# Patient Record
Sex: FEMALE | Race: WHITE | NOT HISPANIC OR LATINO | URBAN - METROPOLITAN AREA
[De-identification: names, ages, dates, MRNs, and addresses within clinical notes are randomized per-mention and may not be internally consistent; named-entity substitution may affect disease eponyms.]

---

## 2021-07-01 ENCOUNTER — OFFICE VISIT (OUTPATIENT)
Dept: URBAN - METROPOLITAN AREA CLINIC 70 | Facility: CLINIC | Age: 66
End: 2021-07-01
Payer: MEDICARE

## 2021-07-01 ENCOUNTER — LAB OUTSIDE AN ENCOUNTER (OUTPATIENT)
Dept: URBAN - METROPOLITAN AREA CLINIC 70 | Facility: CLINIC | Age: 66
End: 2021-07-01

## 2021-07-01 ENCOUNTER — WEB ENCOUNTER (OUTPATIENT)
Dept: URBAN - METROPOLITAN AREA CLINIC 70 | Facility: CLINIC | Age: 66
End: 2021-07-01

## 2021-07-01 VITALS
HEIGHT: 67 IN | HEART RATE: 99 BPM | SYSTOLIC BLOOD PRESSURE: 169 MMHG | TEMPERATURE: 96.1 F | DIASTOLIC BLOOD PRESSURE: 76 MMHG | BODY MASS INDEX: 31.34 KG/M2 | WEIGHT: 199.7 LBS

## 2021-07-01 DIAGNOSIS — K74.5 BILIARY CIRRHOSIS: ICD-10-CM

## 2021-07-01 DIAGNOSIS — Z12.11 COLON CANCER SCREENING: ICD-10-CM

## 2021-07-01 PROCEDURE — 99203 OFFICE O/P NEW LOW 30 MIN: CPT | Performed by: INTERNAL MEDICINE

## 2021-07-01 RX ORDER — OXCARBAZEPINE 150 MG/1
1 TABLET TABLET, FILM COATED ORAL TWICE A DAY
Status: ACTIVE | COMMUNITY

## 2021-07-01 RX ORDER — PREDNISONE 5 MG/1
TABLET ORAL
Qty: 30 | Status: ACTIVE | COMMUNITY

## 2021-07-01 RX ORDER — OXCARBAZEPINE 150 MG/1
TABLET, FILM COATED ORAL
Qty: 30 | Status: DISCONTINUED | COMMUNITY

## 2021-07-01 RX ORDER — ALPRAZOLAM 0.5 MG/1
TABLET ORAL
Qty: 90 | Status: ACTIVE | COMMUNITY

## 2021-07-01 RX ORDER — LISINOPRIL 40 MG/1
1 TABLET TABLET ORAL ONCE A DAY
Status: ACTIVE | COMMUNITY

## 2021-07-01 RX ORDER — DULOXETINE 60 MG/1
TAKE ONE CAPSULE ONCE A DAY CAPSULE, DELAYED RELEASE ORAL
Qty: 30 | Refills: 6 | Status: ACTIVE | COMMUNITY

## 2021-07-01 RX ORDER — FUROSEMIDE 20 MG/1
TAKE 1 TABLET ONCE A DAY AS NEEDED TABLET ORAL
Qty: 30 | Refills: 4 | Status: DISCONTINUED | COMMUNITY

## 2021-07-01 RX ORDER — IPRATROPIUM BROMIDE AND ALBUTEROL SULFATE .5; 3 MG/3ML; MG/3ML
USE 1 VIAL IN NEBULIZER FOUR TIMES DAILY SOLUTION RESPIRATORY (INHALATION)
Qty: 90 | Refills: 0 | Status: DISCONTINUED | COMMUNITY

## 2021-07-01 RX ORDER — TIZANIDINE 4 MG/1
TAKE 1 TABLET TWICE A DAY TABLET ORAL
Qty: 30 | Refills: 0 | Status: ACTIVE | COMMUNITY

## 2021-07-01 RX ORDER — NAPROXEN 500 MG/1
TABLET ORAL
Qty: 60 | Status: ACTIVE | COMMUNITY

## 2021-07-01 RX ORDER — MINOCYCLINE HYDROCHLORIDE 100 MG/1
1 CAPSULE CAPSULE ORAL
Status: ACTIVE | COMMUNITY

## 2021-07-01 RX ORDER — ROSUVASTATIN CALCIUM 10 MG/1
1 TABLET TABLET, FILM COATED ORAL ONCE A DAY
Status: ACTIVE | COMMUNITY

## 2021-07-01 RX ORDER — IBANDRONATE SODIUM 150 MG/1
TABLET, FILM COATED ORAL
Qty: 1 | Status: DISCONTINUED | COMMUNITY

## 2021-07-01 RX ORDER — ROSUVASTATIN 10 MG/1
TAKE ONE TABLET ONCE A DAY TABLET, FILM COATED ORAL
Qty: 30 | Refills: 6 | Status: DISCONTINUED | COMMUNITY

## 2021-07-01 RX ORDER — LEVOTHYROXINE SODIUM 125 UG/1
TABLET ORAL
Qty: 30 | Status: ACTIVE | COMMUNITY

## 2021-07-01 RX ORDER — LISINOPRIL 40 MG/1
TABLET ORAL
Qty: 90 | Status: DISCONTINUED | COMMUNITY

## 2021-07-01 NOTE — HPI-TODAY'S VISIT:
Pt referred for cirrhosis. She recently had a CT scan ordered by her PCP to evaluate generalized abdominal pain. CT scan showed moderate amount of stool and a nondular liver consistent with cirrhosis. No prior hx of liver disease. She denies alcohol use. She denies being constipated. She reports a normal formed stool every day. She had a colonoscopy >10 years ago. No prior EGD.

## 2021-07-08 ENCOUNTER — LAB OUTSIDE AN ENCOUNTER (OUTPATIENT)
Dept: URBAN - METROPOLITAN AREA CLINIC 70 | Facility: CLINIC | Age: 66
End: 2021-07-08

## 2021-07-08 ENCOUNTER — TELEPHONE ENCOUNTER (OUTPATIENT)
Dept: URBAN - METROPOLITAN AREA CLINIC 70 | Facility: CLINIC | Age: 66
End: 2021-07-08

## 2021-07-08 LAB
A/G RATIO: 1.1
ACTIN (SMOOTH MUSCLE) ANTIBODY: 36
AFP, SERUM, TUMOR MARKER: 1.4
ALBUMIN: 3.2
ALKALINE PHOSPHATASE: 313
ALPHA-1-ANTITRYPSIN, SERUM: 206
ALT (SGPT): 18
ANTINUCLEAR ANTIBODIES, IFA: NEGATIVE
AST (SGOT): 24
BILIRUBIN, TOTAL: <0.2
BUN/CREATININE RATIO: 24
BUN: 18
CALCIUM: 8.5
CARBON DIOXIDE, TOTAL: 22
CERULOPLASMIN: 25.2
CHLORIDE: 104
CREATININE: 0.75
EGFR IF AFRICN AM: 96
EGFR IF NONAFRICN AM: 83
FERRITIN, SERUM: 27
GLOBULIN, TOTAL: 2.8
GLUCOSE: 75
HBSAG SCREEN: NEGATIVE
HEMATOCRIT: 37.8
HEMOGLOBIN: 11.2
HEP A AB, TOTAL: NEGATIVE
HEP C VIRUS AB: <0.1
INR: 1
IRON BIND.CAP.(TIBC): 239
IRON SATURATION: 14
IRON: 33
Lab: (no result)
MCH: 28.3
MCHC: 29.6
MCV: 96
MITOCHONDRIAL (M2) ANTIBODY: <20
NRBC: (no result)
PLATELETS: 332
POTASSIUM: 4.5
PROTEIN, TOTAL: 6
PROTHROMBIN TIME: 10.5
RBC: 3.96
RDW: 14.1
SODIUM: 139
UIBC: 206
WBC: 10.3

## 2021-08-04 ENCOUNTER — OFFICE VISIT (OUTPATIENT)
Dept: URBAN - METROPOLITAN AREA CLINIC 70 | Facility: CLINIC | Age: 66
End: 2021-08-04
Payer: MEDICARE

## 2021-08-04 DIAGNOSIS — D50.9 IRON DEFICIENCY ANEMIA, UNSPECIFIED IRON DEFICIENCY ANEMIA TYPE: ICD-10-CM

## 2021-08-04 DIAGNOSIS — K74.69 CIRRHOSIS, CRYPTOGENIC: ICD-10-CM

## 2021-08-04 DIAGNOSIS — D50.8 ACQUIRED IRON DEFICIENCY ANEMIA DUE TO DECREASED ABSORPTION: ICD-10-CM

## 2021-08-04 DIAGNOSIS — K74.60 CIRRHOSIS OF LIVER WITHOUT ASCITES, UNSPECIFIED HEPATIC CIRRHOSIS TYPE: ICD-10-CM

## 2021-08-04 PROCEDURE — 99214 OFFICE O/P EST MOD 30 MIN: CPT | Performed by: REGISTERED NURSE

## 2021-08-04 RX ORDER — MINOCYCLINE HYDROCHLORIDE 100 MG/1
1 CAPSULE CAPSULE ORAL
Status: ACTIVE | COMMUNITY

## 2021-08-04 RX ORDER — ALPRAZOLAM 0.5 MG/1
TABLET ORAL
Qty: 90 | Status: ACTIVE | COMMUNITY

## 2021-08-04 RX ORDER — TIZANIDINE 4 MG/1
TAKE 1 TABLET TWICE A DAY TABLET ORAL
Qty: 30 | Refills: 0 | Status: ACTIVE | COMMUNITY

## 2021-08-04 RX ORDER — LISINOPRIL 40 MG/1
1 TABLET TABLET ORAL ONCE A DAY
Status: ACTIVE | COMMUNITY

## 2021-08-04 RX ORDER — LEVOTHYROXINE SODIUM 125 UG/1
TABLET ORAL
Qty: 30 | Status: ACTIVE | COMMUNITY

## 2021-08-04 RX ORDER — NAPROXEN 500 MG/1
TABLET ORAL
Qty: 60 | Status: ACTIVE | COMMUNITY

## 2021-08-04 RX ORDER — PREDNISONE 5 MG/1
TABLET ORAL
Qty: 30 | Status: ACTIVE | COMMUNITY

## 2021-08-04 RX ORDER — OXCARBAZEPINE 150 MG/1
1 TABLET TABLET, FILM COATED ORAL TWICE A DAY
Status: ACTIVE | COMMUNITY

## 2021-08-04 RX ORDER — ROSUVASTATIN CALCIUM 10 MG/1
1 TABLET TABLET, FILM COATED ORAL ONCE A DAY
Status: ACTIVE | COMMUNITY

## 2021-08-04 RX ORDER — DULOXETINE 60 MG/1
TAKE ONE CAPSULE ONCE A DAY CAPSULE, DELAYED RELEASE ORAL
Qty: 30 | Refills: 6 | Status: ACTIVE | COMMUNITY

## 2021-08-04 NOTE — HPI-TODAY'S VISIT:
Note from OV 7/1/21: Pt referred for cirrhosis. She recently had a CT scan ordered by her PCP to evaluate generalized abdominal pain. CT scan showed moderate amount of stool and a nondular liver consistent with cirrhosis. No prior hx of liver disease. She denies alcohol use. She denies being constipated. She reports a normal formed stool every day. She had a colonoscopy >10 years ago. No prior EGD. ----------------------------- TODAY: Labs show low iron and positive smooth muscle antibody. Alkaline phosphatase is elevated. She is already scheduled for EGD/Colonoscopy.

## 2021-08-10 ENCOUNTER — LAB OUTSIDE AN ENCOUNTER (OUTPATIENT)
Dept: URBAN - METROPOLITAN AREA CLINIC 70 | Facility: CLINIC | Age: 66
End: 2021-08-10

## 2021-08-10 ENCOUNTER — TELEPHONE ENCOUNTER (OUTPATIENT)
Dept: URBAN - METROPOLITAN AREA CLINIC 70 | Facility: CLINIC | Age: 66
End: 2021-08-10

## 2021-12-03 ENCOUNTER — OFFICE VISIT (OUTPATIENT)
Dept: URBAN - METROPOLITAN AREA MEDICAL CENTER 42 | Facility: MEDICAL CENTER | Age: 66
End: 2021-12-03
Payer: MEDICARE

## 2021-12-03 DIAGNOSIS — K63.5 BENIGN COLON POLYP: ICD-10-CM

## 2021-12-03 DIAGNOSIS — K29.60 ADENOPAPILLOMATOSIS GASTRICA: ICD-10-CM

## 2021-12-03 DIAGNOSIS — D50.9 ANEMIA: ICD-10-CM

## 2021-12-03 PROCEDURE — 43239 EGD BIOPSY SINGLE/MULTIPLE: CPT | Performed by: INTERNAL MEDICINE

## 2021-12-03 PROCEDURE — 45380 COLONOSCOPY AND BIOPSY: CPT | Performed by: INTERNAL MEDICINE

## 2021-12-03 RX ORDER — ALPRAZOLAM 0.5 MG/1
TABLET ORAL
Qty: 90 | Status: ACTIVE | COMMUNITY

## 2021-12-03 RX ORDER — TIZANIDINE 4 MG/1
TAKE 1 TABLET TWICE A DAY TABLET ORAL
Qty: 30 | Refills: 0 | Status: ACTIVE | COMMUNITY

## 2021-12-03 RX ORDER — PREDNISONE 5 MG/1
TABLET ORAL
Qty: 30 | Status: ACTIVE | COMMUNITY

## 2021-12-03 RX ORDER — NAPROXEN 500 MG/1
TABLET ORAL
Qty: 60 | Status: ACTIVE | COMMUNITY

## 2021-12-03 RX ORDER — OXCARBAZEPINE 150 MG/1
1 TABLET TABLET, FILM COATED ORAL TWICE A DAY
Status: ACTIVE | COMMUNITY

## 2021-12-03 RX ORDER — ROSUVASTATIN CALCIUM 10 MG/1
1 TABLET TABLET, FILM COATED ORAL ONCE A DAY
Status: ACTIVE | COMMUNITY

## 2021-12-03 RX ORDER — DULOXETINE 60 MG/1
TAKE ONE CAPSULE ONCE A DAY CAPSULE, DELAYED RELEASE ORAL
Qty: 30 | Refills: 6 | Status: ACTIVE | COMMUNITY

## 2021-12-03 RX ORDER — LEVOTHYROXINE SODIUM 125 UG/1
TABLET ORAL
Qty: 30 | Status: ACTIVE | COMMUNITY

## 2021-12-03 RX ORDER — MINOCYCLINE HYDROCHLORIDE 100 MG/1
1 CAPSULE CAPSULE ORAL
Status: ACTIVE | COMMUNITY

## 2021-12-03 RX ORDER — LISINOPRIL 40 MG/1
1 TABLET TABLET ORAL ONCE A DAY
Status: ACTIVE | COMMUNITY

## 2021-12-10 ENCOUNTER — OFFICE VISIT (OUTPATIENT)
Dept: URBAN - METROPOLITAN AREA CLINIC 70 | Facility: CLINIC | Age: 66
End: 2021-12-10
Payer: MEDICARE

## 2021-12-10 VITALS
DIASTOLIC BLOOD PRESSURE: 108 MMHG | WEIGHT: 185.2 LBS | HEIGHT: 67 IN | BODY MASS INDEX: 29.07 KG/M2 | TEMPERATURE: 97.5 F | SYSTOLIC BLOOD PRESSURE: 156 MMHG | HEART RATE: 97 BPM

## 2021-12-10 DIAGNOSIS — R74.8 ELEVATED LIVER ENZYMES: ICD-10-CM

## 2021-12-10 DIAGNOSIS — D50.9 IRON DEFICIENCY ANEMIA, UNSPECIFIED IRON DEFICIENCY ANEMIA TYPE: ICD-10-CM

## 2021-12-10 PROCEDURE — 99214 OFFICE O/P EST MOD 30 MIN: CPT | Performed by: REGISTERED NURSE

## 2021-12-10 RX ORDER — OXCARBAZEPINE 150 MG/1
1 TABLET TABLET, FILM COATED ORAL TWICE A DAY
Status: ACTIVE | COMMUNITY

## 2021-12-10 RX ORDER — ROSUVASTATIN CALCIUM 10 MG/1
1 TABLET TABLET, FILM COATED ORAL ONCE A DAY
Status: ACTIVE | COMMUNITY

## 2021-12-10 RX ORDER — ALPRAZOLAM 0.5 MG/1
TABLET ORAL
Qty: 90 | Status: ACTIVE | COMMUNITY

## 2021-12-10 RX ORDER — LEVOTHYROXINE SODIUM 125 UG/1
TABLET ORAL
Qty: 30 | Status: ACTIVE | COMMUNITY

## 2021-12-10 RX ORDER — DULOXETINE 60 MG/1
TAKE ONE CAPSULE ONCE A DAY CAPSULE, DELAYED RELEASE ORAL
Qty: 30 | Refills: 6 | Status: ACTIVE | COMMUNITY

## 2021-12-10 RX ORDER — MINOCYCLINE HYDROCHLORIDE 100 MG/1
1 CAPSULE CAPSULE ORAL
Status: ACTIVE | COMMUNITY

## 2021-12-10 RX ORDER — PREDNISONE 5 MG/1
TABLET ORAL
Qty: 30 | Status: ACTIVE | COMMUNITY

## 2021-12-10 RX ORDER — LISINOPRIL 40 MG/1
1 TABLET TABLET ORAL ONCE A DAY
Status: ACTIVE | COMMUNITY

## 2021-12-10 RX ORDER — TIZANIDINE 4 MG/1
TAKE 1 TABLET TWICE A DAY TABLET ORAL
Qty: 30 | Refills: 0 | Status: ACTIVE | COMMUNITY

## 2021-12-10 RX ORDER — NAPROXEN 500 MG/1
TABLET ORAL
Qty: 60 | Status: ACTIVE | COMMUNITY

## 2021-12-10 NOTE — HPI-TODAY'S VISIT:
Note from OV 7/1/21: Pt referred for cirrhosis. She recently had a CT scan ordered by her PCP to evaluate generalized abdominal pain. CT scan showed moderate amount of stool and a nondular liver consistent with cirrhosis. No prior hx of liver disease. She denies alcohol use. She denies being constipated. She reports a normal formed stool every day. She had a colonoscopy >10 years ago. No prior EGD. ----------------------------- Note from OV 8/4/21: Labs show low iron and positive smooth muscle antibody. Alkaline phosphatase is elevated. She is already scheduled for EGD/Colonoscopy. ------------------------------ TODAY 12/10/21: Liver biopsy shows Grade 1 inflammation and stage 2 fibrosis. 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 She had EGD/colonoscopy done to evaluate KULWANT.  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

## 2022-01-18 PROBLEM — 87522002: Status: ACTIVE | Noted: 2021-08-04

## 2022-01-19 ENCOUNTER — TELEPHONE ENCOUNTER (OUTPATIENT)
Dept: URBAN - METROPOLITAN AREA CLINIC 92 | Facility: CLINIC | Age: 67
End: 2022-01-19

## 2022-01-19 ENCOUNTER — OFFICE VISIT (OUTPATIENT)
Dept: URBAN - METROPOLITAN AREA TELEHEALTH 2 | Facility: TELEHEALTH | Age: 67
End: 2022-01-19
Payer: MEDICARE

## 2022-01-19 DIAGNOSIS — Z98.890 HISTORY OF LIVER BIOPSY: ICD-10-CM

## 2022-01-19 DIAGNOSIS — R76.0 ABNORMAL ANTIBODY TITER: ICD-10-CM

## 2022-01-19 DIAGNOSIS — K76.0 FATTY LIVER: ICD-10-CM

## 2022-01-19 DIAGNOSIS — K74.69 CIRRHOSIS, CRYPTOGENIC: ICD-10-CM

## 2022-01-19 DIAGNOSIS — Z71.85 VACCINE COUNSELING: ICD-10-CM

## 2022-01-19 DIAGNOSIS — Z79.899 HIGH RISK MEDICATION USE: ICD-10-CM

## 2022-01-19 PROCEDURE — 99245 OFF/OP CONSLTJ NEW/EST HI 55: CPT | Performed by: PHYSICIAN ASSISTANT

## 2022-01-19 PROCEDURE — 99205 OFFICE O/P NEW HI 60 MIN: CPT | Performed by: PHYSICIAN ASSISTANT

## 2022-01-19 RX ORDER — MINOCYCLINE HYDROCHLORIDE 100 MG/1
1 CAPSULE CAPSULE ORAL
Status: ON HOLD | COMMUNITY

## 2022-01-19 RX ORDER — ROSUVASTATIN CALCIUM 10 MG/1
1 TABLET TABLET, FILM COATED ORAL ONCE A DAY
Status: ON HOLD | COMMUNITY

## 2022-01-19 RX ORDER — LEVOTHYROXINE SODIUM 125 UG/1
TABLET ORAL
Qty: 30 | Status: ACTIVE | COMMUNITY

## 2022-01-19 RX ORDER — LISINOPRIL 40 MG/1
1 TABLET TABLET ORAL ONCE A DAY
Status: ACTIVE | COMMUNITY

## 2022-01-19 RX ORDER — PREDNISONE 5 MG/1
TABLET ORAL
Qty: 30 | Status: ON HOLD | COMMUNITY

## 2022-01-19 RX ORDER — ALPRAZOLAM 0.5 MG/1
TABLET ORAL
Qty: 90 | Status: ACTIVE | COMMUNITY

## 2022-01-19 RX ORDER — OXCARBAZEPINE 150 MG/1
1 TABLET TABLET, FILM COATED ORAL TWICE A DAY
Status: ACTIVE | COMMUNITY

## 2022-01-19 RX ORDER — TIZANIDINE 4 MG/1
TAKE 1 TABLET TWICE A DAY TABLET ORAL
Qty: 30 | Refills: 0 | Status: ACTIVE | COMMUNITY

## 2022-01-19 RX ORDER — DULOXETINE 60 MG/1
TAKE ONE CAPSULE ONCE A DAY CAPSULE, DELAYED RELEASE ORAL
Qty: 30 | Refills: 6 | Status: ACTIVE | COMMUNITY

## 2022-01-19 RX ORDER — NAPROXEN 500 MG/1
TABLET ORAL
Qty: 60 | Status: ACTIVE | COMMUNITY

## 2022-01-19 NOTE — HPI-TODAY'S VISIT:
Patient was referred by Dr. Esme Manning for an evaluation of fatty liver.  A copy of this note will be sent to the referring provider.        Pt here for TH visit via video [employer] daysi. was noted to have cirrhosis noted on prior CT scan.  she had noted abnormal lfts approx 6 months ago.   She is currently being treated for an autoimmune skin condition(bullous pemphigoid) now off of prednisone which can cause abnormal lfts has been on doxy 100mg for this for 1 year, need to monitor as this can caues abnormal lfts. also on cymbalta which can cause abnormal lfts.  derm asked about dupilumab which is category E. will check hep b core ab status.  Hepatotoxicity In several large randomized, placebo controlled trials, serum enzyme and bilirubin levels did not change during dupilumab therapy and there were no reported liver related serious adverse events or causes for early discontinuation. There have been no published reports of clinically apparent acute liver injury attributed to dupilumab therapy, but it has had limited general clinical use. Thus, liver injury from dupilumab must be rare, if it occurs at all.  Likelihood score: E (unlikely cause of clinically apparent liver injury).  she was taken off of statin rx 2 months ago.  she had labs done by pcp in oct 2021-was told lfts were abnormal  Liver biopsy from August 2021 shows mild fatty change, minimal chronic hepatitis grade 1, moderate fibrosis, stage II.  Under the description it states there is moderate degree of periportal fibrosis with few bridging portal to portal fibrosis.  Minimal degree of lobular inflammation is noted with rare spotty necrosis.  Mild microvesicular fatty change less than 20% is noted.  Clinical laboratory and serologic findings is suggested. will have this path reviewed by dr. paul  Duration of visit 45 mins with over 50% of the time explaining patients condition and treatment plan

## 2022-01-28 LAB
A/G RATIO: 0.5
ALBUMIN: 2.7
ALKALINE PHOSPHATASE: 793
ALPHA-1-ANTITRYPSIN, SERUM: 169
ALT (SGPT): 491
AST (SGOT): 780
BASO (ABSOLUTE): 0
BASOS: 1
BILIRUBIN, TOTAL: 0.9
BONE FRACTION:: 35
BUN/CREATININE RATIO: 19
BUN: 15
CALCIUM: 8.3
CARBON DIOXIDE, TOTAL: 20
CHLORIDE: 99
CREATININE: 0.79
EGFR IF AFRICN AM: 90
EGFR IF NONAFRICN AM: 78
EOS (ABSOLUTE): 0.3
EOS: 6
GLOBULIN, TOTAL: 6
GLUCOSE: 98
HEMATOCRIT: 29.1
HEMATOLOGY COMMENTS:: (no result)
HEMOGLOBIN: 9.1
HEP B CORE AB, TOT: NEGATIVE
HEPATITIS B SURF AB QUANT: <3.1
IMMATURE CELLS: (no result)
IMMATURE GRANS (ABS): 0
IMMATURE GRANULOCYTES: 1
IMMUNOGLOBULIN G, QN, SERUM: 4600
IMMUNOGLOBULIN M, QN, SERUM: 109
INR: 1
INTESTINAL FRAC.:: 3
LIVER FRACTION:: 62
LYMPHS (ABSOLUTE): 1.5
LYMPHS: 37
MCH: 27
MCHC: 31.3
MCV: 86
MONOCYTES(ABSOLUTE): 0.5
MONOCYTES: 13
NEUTROPHILS (ABSOLUTE): 1.7
NEUTROPHILS: 42
NRBC: (no result)
PHENOTYPE (PI): (no result)
PLATELETS: 224
POTASSIUM: 4.5
PROTEIN, TOTAL: 8.7
PROTHROMBIN TIME: 10.4
RBC: 3.37
RDW: 13.9
SODIUM: 126
WBC: 4

## 2022-01-30 ENCOUNTER — TELEPHONE ENCOUNTER (OUTPATIENT)
Dept: URBAN - METROPOLITAN AREA CLINIC 92 | Facility: CLINIC | Age: 67
End: 2022-01-30

## 2022-02-01 ENCOUNTER — OFFICE VISIT (OUTPATIENT)
Dept: URBAN - METROPOLITAN AREA CLINIC 92 | Facility: CLINIC | Age: 67
End: 2022-02-01
Payer: MEDICARE

## 2022-02-01 VITALS — BODY MASS INDEX: 27 KG/M2 | HEIGHT: 67 IN | WEIGHT: 172 LBS

## 2022-02-01 DIAGNOSIS — Z71.85 VACCINE COUNSELING: ICD-10-CM

## 2022-02-01 DIAGNOSIS — K76.0 FATTY LIVER: ICD-10-CM

## 2022-02-01 DIAGNOSIS — R76.0 ABNORMAL ANTIBODY TITER: ICD-10-CM

## 2022-02-01 DIAGNOSIS — D64.9 LOW HEMOGLOBIN: ICD-10-CM

## 2022-02-01 DIAGNOSIS — Z98.890 HISTORY OF LIVER BIOPSY: ICD-10-CM

## 2022-02-01 DIAGNOSIS — K74.60 CIRRHOSIS OF LIVER WITHOUT ASCITES, UNSPECIFIED HEPATIC CIRRHOSIS TYPE: ICD-10-CM

## 2022-02-01 DIAGNOSIS — Z79.899 HIGH RISK MEDICATION USE: ICD-10-CM

## 2022-02-01 PROCEDURE — 99214 OFFICE O/P EST MOD 30 MIN: CPT | Performed by: PHYSICIAN ASSISTANT

## 2022-02-01 RX ORDER — LISINOPRIL 40 MG/1
1 TABLET TABLET ORAL ONCE A DAY
Status: ACTIVE | COMMUNITY

## 2022-02-01 RX ORDER — ROSUVASTATIN CALCIUM 10 MG/1
1 TABLET TABLET, FILM COATED ORAL ONCE A DAY
Status: ON HOLD | COMMUNITY

## 2022-02-01 RX ORDER — MINOCYCLINE HYDROCHLORIDE 100 MG/1
1 CAPSULE CAPSULE ORAL
Status: ON HOLD | COMMUNITY

## 2022-02-01 RX ORDER — DULOXETINE 60 MG/1
TAKE ONE CAPSULE ONCE A DAY CAPSULE, DELAYED RELEASE ORAL
Qty: 30 | Refills: 6 | Status: ACTIVE | COMMUNITY

## 2022-02-01 RX ORDER — TIZANIDINE 4 MG/1
TAKE 1 TABLET TWICE A DAY TABLET ORAL
Qty: 30 | Refills: 0 | Status: ACTIVE | COMMUNITY

## 2022-02-01 RX ORDER — PREDNISONE 5 MG/1
TABLET ORAL
Qty: 30 | Status: ON HOLD | COMMUNITY

## 2022-02-01 RX ORDER — ALPRAZOLAM 0.5 MG/1
TABLET ORAL
Qty: 90 | Status: ACTIVE | COMMUNITY

## 2022-02-01 RX ORDER — LEVOTHYROXINE SODIUM 125 UG/1
TABLET ORAL
Qty: 30 | Status: ACTIVE | COMMUNITY

## 2022-02-01 RX ORDER — OXCARBAZEPINE 150 MG/1
1 TABLET TABLET, FILM COATED ORAL TWICE A DAY
Status: ACTIVE | COMMUNITY

## 2022-02-01 RX ORDER — NAPROXEN 500 MG/1
TABLET ORAL
Qty: 60 | Status: ACTIVE | COMMUNITY

## 2022-02-01 NOTE — HPI-TODAY'S VISIT:
Patient was referred by Dr. Esme Manning for an evaluation of fatty liver.  A copy of this note will be sent to the referring provider.            Pt here for TH visit due to elevated lfts and igg. Pt was dx with covid on 1/25, had symptoms the week prior. stopped doxy on 1/26, she has one more day of prednisone dose.  went to the hospital and received infusion of MAB last week.  she had symptoms around the time she did our labs. suspect bump from this and/or DILI from doxy and cymbalta. since she's been off of doxy, recommend she avoid this and recommend shes top cymbalta as well. will also set her up to do an u/s with doppler now assessing vessel patency. since friday will be day 10 after covid exposure, will have her do labs then.   RECAP:  was noted to have cirrhosis noted on prior CT scan.  she had noted abnormal lfts approx 6 months ago.    She is currently being treated for an autoimmune skin condition(bullous pemphigoid) now off of prednisone which can cause abnormal lfts has been on doxy 100mg for this for 1 year, need to monitor as this can caues abnormal lfts. also on cymbalta which can cause abnormal lfts.  derm asked about dupilumab which is category E. will check hep b core ab status.  Hepatotoxicity In several large randomized, placebo controlled trials, serum enzyme and bilirubin levels did not change during dupilumab therapy and there were no reported liver related serious adverse events or causes for early discontinuation. There have been no published reports of clinically apparent acute liver injury attributed to dupilumab therapy, but it has had limited general clinical use. Thus, liver injury from dupilumab must be rare, if it occurs at all.  Likelihood score: E (unlikely cause of clinically apparent liver injury).  she was taken off of statin rx 2 months ago.  she had labs done by pcp in oct 2021-was told lfts were abnormal  Liver biopsy from August 2021 shows mild fatty change, minimal chronic hepatitis grade 1, moderate fibrosis, stage II.  Under the description it states there is moderate degree of periportal fibrosis with few bridging portal to portal fibrosis.  Minimal degree of lobular inflammation is noted with rare spotty necrosis.  Mild microvesicular fatty change less than 20% is noted.  Clinical laboratory and serologic findings is suggested. will have this path reviewed by dr. paul

## 2022-02-01 NOTE — PHYSICAL EXAM SKIN:
From: Patricio HARVEY Head  To: Mian Heredia  Sent: 2/25/2021 1:06 PM CST  Subject: Upcoming Appointment    Doc this week I have gone from peeing every two hours to every 4-5 and it is much stronger so your thoughts   no rashes , no suspicious lesions , no areas of discoloration

## 2022-02-04 ENCOUNTER — TELEPHONE ENCOUNTER (OUTPATIENT)
Dept: URBAN - METROPOLITAN AREA CLINIC 92 | Facility: CLINIC | Age: 67
End: 2022-02-04

## 2022-02-08 ENCOUNTER — LAB OUTSIDE AN ENCOUNTER (OUTPATIENT)
Dept: URBAN - METROPOLITAN AREA CLINIC 92 | Facility: CLINIC | Age: 67
End: 2022-02-08

## 2022-02-08 ENCOUNTER — OFFICE VISIT (OUTPATIENT)
Dept: URBAN - METROPOLITAN AREA CLINIC 92 | Facility: CLINIC | Age: 67
End: 2022-02-08
Payer: MEDICARE

## 2022-02-08 VITALS — BODY MASS INDEX: 27 KG/M2 | HEIGHT: 67 IN | WEIGHT: 172 LBS

## 2022-02-08 DIAGNOSIS — K74.60 CIRRHOSIS OF LIVER WITHOUT ASCITES, UNSPECIFIED HEPATIC CIRRHOSIS TYPE: ICD-10-CM

## 2022-02-08 DIAGNOSIS — K76.0 FATTY LIVER: ICD-10-CM

## 2022-02-08 DIAGNOSIS — D64.9 LOW HEMOGLOBIN: ICD-10-CM

## 2022-02-08 DIAGNOSIS — Z98.890 HISTORY OF LIVER BIOPSY: ICD-10-CM

## 2022-02-08 DIAGNOSIS — Z71.85 VACCINE COUNSELING: ICD-10-CM

## 2022-02-08 DIAGNOSIS — Z79.899 HIGH RISK MEDICATION USE: ICD-10-CM

## 2022-02-08 DIAGNOSIS — R76.0 ABNORMAL ANTIBODY TITER: ICD-10-CM

## 2022-02-08 LAB
A/G RATIO: 0.7
ALBUMIN: 3.1
ALKALINE PHOSPHATASE: 359
ALT (SGPT): 138
AST (SGOT): 118
BILIRUBIN, TOTAL: 0.6
BUN/CREATININE RATIO: 20
BUN: 15
CALCIUM: 8.7
CARBON DIOXIDE, TOTAL: 23
CHLORIDE: 99
CREATININE: 0.76
EGFR IF AFRICN AM: 95
EGFR IF NONAFRICN AM: 82
GLOBULIN, TOTAL: 4.3
GLUCOSE: 77
IMMUNOGLOBULIN G, QN, SERUM: 2657
INR: 0.9
POTASSIUM: 4.5
PROTEIN, TOTAL: 7.4
PROTHROMBIN TIME: 9.6
SODIUM: 132

## 2022-02-08 PROCEDURE — 99214 OFFICE O/P EST MOD 30 MIN: CPT | Performed by: PHYSICIAN ASSISTANT

## 2022-02-08 RX ORDER — ROSUVASTATIN CALCIUM 10 MG/1
1 TABLET TABLET, FILM COATED ORAL ONCE A DAY
Status: ON HOLD | COMMUNITY

## 2022-02-08 RX ORDER — DULOXETINE 60 MG/1
TAKE ONE CAPSULE ONCE A DAY CAPSULE, DELAYED RELEASE ORAL
Qty: 30 | Refills: 6 | Status: ACTIVE | COMMUNITY

## 2022-02-08 RX ORDER — MINOCYCLINE HYDROCHLORIDE 100 MG/1
1 CAPSULE CAPSULE ORAL
Status: ON HOLD | COMMUNITY

## 2022-02-08 RX ORDER — LEVOTHYROXINE SODIUM 125 UG/1
TABLET ORAL
Qty: 30 | Status: ACTIVE | COMMUNITY

## 2022-02-08 RX ORDER — TIZANIDINE 4 MG/1
TAKE 1 TABLET TWICE A DAY TABLET ORAL
Qty: 30 | Refills: 0 | Status: ACTIVE | COMMUNITY

## 2022-02-08 RX ORDER — PREDNISONE 5 MG/1
TABLET ORAL
Qty: 30 | Status: ON HOLD | COMMUNITY

## 2022-02-08 RX ORDER — NAPROXEN 500 MG/1
TABLET ORAL
Qty: 60 | Status: ACTIVE | COMMUNITY

## 2022-02-08 RX ORDER — ALPRAZOLAM 0.5 MG/1
TABLET ORAL
Qty: 90 | Status: ACTIVE | COMMUNITY

## 2022-02-08 RX ORDER — OXCARBAZEPINE 150 MG/1
1 TABLET TABLET, FILM COATED ORAL TWICE A DAY
Status: ACTIVE | COMMUNITY

## 2022-02-08 RX ORDER — LISINOPRIL 40 MG/1
1 TABLET TABLET ORAL ONCE A DAY
Status: ACTIVE | COMMUNITY

## 2022-02-08 NOTE — HPI-TODAY'S VISIT:
Patient was referred by Dr. Esme Manning for an evaluation of fatty liver.  A copy of this note will be sent to the referring provider.               Pt here for TH visit due to elevated lfts and igg.  She stopped cymbalta 2/2/22  and could only be off for 3 days and then had bad panic attacks and needed to restart   RECAP: Pt was dx with covid on 1/25, had symptoms the week prior. stopped doxy on 1/26, she has one more day of prednisone dose.  went to the hospital and received infusion of MAB last week.  she had symptoms around the time she did our labs. suspect bump from this and/or DILI from doxy and cymbalta. since she's been off of doxy, recommend she avoid this and recommend shes top cymbalta as well. will also set her up to do an u/s with doppler now assessing vessel patency. since friday will be day 10 after covid exposure, will have her do labs then.   was noted to have cirrhosis noted on prior CT scan.  she had noted abnormal lfts approx 6 months ago.    She is currently being treated for an autoimmune skin condition(bullous pemphigoid) now off of prednisone which can cause abnormal lfts has been on doxy 100mg for this for 1 year, need to monitor as this can caues abnormal lfts. also on cymbalta which can cause abnormal lfts.  derm asked about dupilumab which is category E. will check hep b core ab status.  Hepatotoxicity In several large randomized, placebo controlled trials, serum enzyme and bilirubin levels did not change during dupilumab therapy and there were no reported liver related serious adverse events or causes for early discontinuation. There have been no published reports of clinically apparent acute liver injury attributed to dupilumab therapy, but it has had limited general clinical use. Thus, liver injury from dupilumab must be rare, if it occurs at all.  Likelihood score: E (unlikely cause of clinically apparent liver injury).  she was taken off of statin rx 2 months ago.  she had labs done by pcp in oct 2021-was told lfts were abnormal  Liver biopsy from August 2021 shows mild fatty change, minimal chronic hepatitis grade 1, moderate fibrosis, stage II.  Under the description it states there is moderate degree of periportal fibrosis with few bridging portal to portal fibrosis.  Minimal degree of lobular inflammation is noted with rare spotty necrosis.  Mild microvesicular fatty change less than 20% is noted.  Clinical laboratory and serologic findings is suggested. will have this path reviewed by dr. paul

## 2022-02-14 ENCOUNTER — LAB OUTSIDE AN ENCOUNTER (OUTPATIENT)
Dept: URBAN - METROPOLITAN AREA CLINIC 86 | Facility: CLINIC | Age: 67
End: 2022-02-14

## 2022-02-15 ENCOUNTER — LAB OUTSIDE AN ENCOUNTER (OUTPATIENT)
Dept: URBAN - METROPOLITAN AREA CLINIC 92 | Facility: CLINIC | Age: 67
End: 2022-02-15

## 2022-02-15 ENCOUNTER — TELEPHONE ENCOUNTER (OUTPATIENT)
Dept: URBAN - METROPOLITAN AREA CLINIC 92 | Facility: CLINIC | Age: 67
End: 2022-02-15

## 2022-02-15 LAB
A/G RATIO: 0.7
ALBUMIN: 3.4
ALKALINE PHOSPHATASE: 358
ALT (SGPT): 144
AST (SGOT): 144
BILIRUBIN, TOTAL: 0.5
BUN/CREATININE RATIO: 13
BUN: 12
CALCIUM: 8.9
CARBON DIOXIDE, TOTAL: 22
CHLORIDE: 100
CREATININE: 0.95
EGFR IF AFRICN AM: 72
EGFR IF NONAFRICN AM: 63
GLOBULIN, TOTAL: 4.7
GLUCOSE: 101
IMMUNOGLOBULIN G, QN, SERUM: 2895
INR: 0.9
POTASSIUM: 4.1
PROTEIN, TOTAL: 8.1
PROTHROMBIN TIME: 10
SODIUM: 134

## 2022-02-16 ENCOUNTER — LAB OUTSIDE AN ENCOUNTER (OUTPATIENT)
Dept: URBAN - METROPOLITAN AREA TELEHEALTH 2 | Facility: TELEHEALTH | Age: 67
End: 2022-02-16

## 2022-02-17 ENCOUNTER — OFFICE VISIT (OUTPATIENT)
Dept: URBAN - METROPOLITAN AREA CLINIC 16 | Facility: CLINIC | Age: 67
End: 2022-02-17
Payer: MEDICARE

## 2022-02-17 DIAGNOSIS — K74.69 CIRRHOSIS, CRYPTOGENIC: ICD-10-CM

## 2022-02-17 PROCEDURE — 93975 VASCULAR STUDY: CPT

## 2022-02-17 PROCEDURE — 76705 ECHO EXAM OF ABDOMEN: CPT

## 2022-02-17 RX ORDER — LEVOTHYROXINE SODIUM 125 UG/1
TABLET ORAL
Qty: 30 | Status: ACTIVE | COMMUNITY

## 2022-02-17 RX ORDER — NAPROXEN 500 MG/1
TABLET ORAL
Qty: 60 | Status: ACTIVE | COMMUNITY

## 2022-02-17 RX ORDER — OXCARBAZEPINE 150 MG/1
1 TABLET TABLET, FILM COATED ORAL TWICE A DAY
Status: ACTIVE | COMMUNITY

## 2022-02-17 RX ORDER — DULOXETINE 60 MG/1
TAKE ONE CAPSULE ONCE A DAY CAPSULE, DELAYED RELEASE ORAL
Qty: 30 | Refills: 6 | Status: ACTIVE | COMMUNITY

## 2022-02-17 RX ORDER — TIZANIDINE 4 MG/1
TAKE 1 TABLET TWICE A DAY TABLET ORAL
Qty: 30 | Refills: 0 | Status: ACTIVE | COMMUNITY

## 2022-02-17 RX ORDER — ROSUVASTATIN CALCIUM 10 MG/1
1 TABLET TABLET, FILM COATED ORAL ONCE A DAY
Status: ON HOLD | COMMUNITY

## 2022-02-17 RX ORDER — ALPRAZOLAM 0.5 MG/1
TABLET ORAL
Qty: 90 | Status: ACTIVE | COMMUNITY

## 2022-02-17 RX ORDER — PREDNISONE 5 MG/1
TABLET ORAL
Qty: 30 | Status: ON HOLD | COMMUNITY

## 2022-02-17 RX ORDER — LISINOPRIL 40 MG/1
1 TABLET TABLET ORAL ONCE A DAY
Status: ACTIVE | COMMUNITY

## 2022-02-17 RX ORDER — MINOCYCLINE HYDROCHLORIDE 100 MG/1
1 CAPSULE CAPSULE ORAL
Status: ON HOLD | COMMUNITY

## 2022-02-22 ENCOUNTER — LAB OUTSIDE AN ENCOUNTER (OUTPATIENT)
Dept: URBAN - METROPOLITAN AREA CLINIC 92 | Facility: CLINIC | Age: 67
End: 2022-02-22

## 2022-02-23 LAB
A/G RATIO: 0.7
ALBUMIN: 3.4
ALKALINE PHOSPHATASE: 338
ALT (SGPT): 133
AST (SGOT): 145
BILIRUBIN, TOTAL: 0.4
BUN/CREATININE RATIO: 19
BUN: 16
CALCIUM: 8.8
CARBON DIOXIDE, TOTAL: 22
CHLORIDE: 100
CREATININE: 0.84
EGFR IF AFRICN AM: 84
EGFR IF NONAFRICN AM: 73
GLOBULIN, TOTAL: 4.9
GLUCOSE: 83
IMMUNOGLOBULIN G, QN, SERUM: 2883
INR: 0.9
POTASSIUM: 4.5
PROTEIN, TOTAL: 8.3
PROTHROMBIN TIME: 9.9
SODIUM: 132

## 2022-02-24 ENCOUNTER — TELEPHONE ENCOUNTER (OUTPATIENT)
Dept: URBAN - METROPOLITAN AREA CLINIC 92 | Facility: CLINIC | Age: 67
End: 2022-02-24

## 2022-02-24 ENCOUNTER — OFFICE VISIT (OUTPATIENT)
Dept: URBAN - METROPOLITAN AREA TELEHEALTH 2 | Facility: TELEHEALTH | Age: 67
End: 2022-02-24
Payer: MEDICARE

## 2022-02-24 VITALS — WEIGHT: 172 LBS | HEIGHT: 67 IN | BODY MASS INDEX: 27 KG/M2

## 2022-02-24 DIAGNOSIS — Z71.85 VACCINE COUNSELING: ICD-10-CM

## 2022-02-24 DIAGNOSIS — Z98.890 HISTORY OF LIVER BIOPSY: ICD-10-CM

## 2022-02-24 DIAGNOSIS — K76.0 FATTY LIVER: ICD-10-CM

## 2022-02-24 DIAGNOSIS — D64.9 LOW HEMOGLOBIN: ICD-10-CM

## 2022-02-24 DIAGNOSIS — Z79.899 HIGH RISK MEDICATION USE: ICD-10-CM

## 2022-02-24 DIAGNOSIS — R76.0 ABNORMAL ANTIBODY TITER: ICD-10-CM

## 2022-02-24 DIAGNOSIS — K74.60 CIRRHOSIS OF LIVER WITHOUT ASCITES, UNSPECIFIED HEPATIC CIRRHOSIS TYPE: ICD-10-CM

## 2022-02-24 PROCEDURE — 99214 OFFICE O/P EST MOD 30 MIN: CPT | Performed by: PHYSICIAN ASSISTANT

## 2022-02-24 RX ORDER — MINOCYCLINE HYDROCHLORIDE 100 MG/1
1 CAPSULE CAPSULE ORAL
Status: ON HOLD | COMMUNITY

## 2022-02-24 RX ORDER — ROSUVASTATIN CALCIUM 10 MG/1
1 TABLET TABLET, FILM COATED ORAL ONCE A DAY
Status: ON HOLD | COMMUNITY

## 2022-02-24 RX ORDER — LISINOPRIL 40 MG/1
1 TABLET TABLET ORAL ONCE A DAY
Status: ACTIVE | COMMUNITY

## 2022-02-24 RX ORDER — DULOXETINE 60 MG/1
TAKE ONE CAPSULE ONCE A DAY CAPSULE, DELAYED RELEASE ORAL
Qty: 30 | Refills: 6 | Status: ACTIVE | COMMUNITY

## 2022-02-24 RX ORDER — LEVOTHYROXINE SODIUM 125 UG/1
TABLET ORAL
Qty: 30 | Status: ACTIVE | COMMUNITY

## 2022-02-24 RX ORDER — PREDNISONE 5 MG/1
TABLET ORAL
Qty: 30 | Status: ON HOLD | COMMUNITY

## 2022-02-24 RX ORDER — TIZANIDINE 4 MG/1
TAKE 1 TABLET TWICE A DAY TABLET ORAL
Qty: 30 | Refills: 0 | Status: ACTIVE | COMMUNITY

## 2022-02-24 RX ORDER — URSODIOL 300 MG/1
1 TABLET CAPSULE ORAL
Qty: 60 | Refills: 1 | OUTPATIENT
Start: 2022-03-01

## 2022-02-24 RX ORDER — ALPRAZOLAM 0.5 MG/1
TABLET ORAL
Qty: 90 | Status: ACTIVE | COMMUNITY

## 2022-02-24 RX ORDER — NAPROXEN 500 MG/1
TABLET ORAL
Qty: 60 | Status: ACTIVE | COMMUNITY

## 2022-02-24 RX ORDER — OXCARBAZEPINE 150 MG/1
1 TABLET TABLET, FILM COATED ORAL TWICE A DAY
Status: ACTIVE | COMMUNITY

## 2022-02-24 NOTE — HPI-TODAY'S VISIT:
Patient was referred by Dr. Esme Manning for an evaluation of fatty liver.  A copy of this note will be sent to the referring provider.                Pt here for TH visit due to elevated lfts and igg.  She stopped cymbalta 2/2/22  and could only be off for 3 days and then had bad panic attacks and needed to restart.   feb 2022 u/s shows: Liver appearing normal, no liver lesion noted.  Pancreas normal, spleen normal in size.  Patent hepatic vasculature.  Common bile duct measures 5.6 mm which is within normal limits for postcholecystectomy state  RECAP: Pt was dx with covid on 1/25, had symptoms the week prior. stopped doxy on 1/26, she has one more day of prednisone dose.  went to the hospital and received infusion of MAB last week.  she had symptoms around the time she did our labs. suspect bump from this and/or DILI from doxy and cymbalta. since she's been off of doxy, recommend she avoid this and recommend shes top cymbalta as well. will also set her up to do an u/s with doppler now assessing vessel patency. since friday will be day 10 after covid exposure, will have her do labs then.   was noted to have cirrhosis noted on prior CT scan.  she had noted abnormal lfts approx 6 months ago.    She is currently being treated for an autoimmune skin condition(bullous pemphigoid) now off of prednisone which can cause abnormal lfts has been on doxy 100mg for this for 1 year, need to monitor as this can caues abnormal lfts. also on cymbalta which can cause abnormal lfts.  derm asked about dupilumab which is category E. will check hep b core ab status.  Hepatotoxicity In several large randomized, placebo controlled trials, serum enzyme and bilirubin levels did not change during dupilumab therapy and there were no reported liver related serious adverse events or causes for early discontinuation. There have been no published reports of clinically apparent acute liver injury attributed to dupilumab therapy, but it has had limited general clinical use. Thus, liver injury from dupilumab must be rare, if it occurs at all.  Likelihood score: E (unlikely cause of clinically apparent liver injury).  she was taken off of statin rx 2 months ago.  she had labs done by pcp in oct 2021-was told lfts were abnormal  Liver biopsy from August 2021 shows mild fatty change, minimal chronic hepatitis grade 1, moderate fibrosis, stage II.  Under the description it states there is moderate degree of periportal fibrosis with few bridging portal to portal fibrosis.  Minimal degree of lobular inflammation is noted with rare spotty necrosis.  Mild microvesicular fatty change less than 20% is noted.  Clinical laboratory and serologic findings is suggested. will have this path reviewed by dr. paul

## 2022-03-10 ENCOUNTER — LAB OUTSIDE AN ENCOUNTER (OUTPATIENT)
Dept: URBAN - METROPOLITAN AREA TELEHEALTH 2 | Facility: TELEHEALTH | Age: 67
End: 2022-03-10

## 2022-03-18 ENCOUNTER — TELEPHONE ENCOUNTER (OUTPATIENT)
Dept: URBAN - METROPOLITAN AREA CLINIC 86 | Facility: CLINIC | Age: 67
End: 2022-03-18

## 2022-03-24 ENCOUNTER — LAB OUTSIDE AN ENCOUNTER (OUTPATIENT)
Dept: URBAN - METROPOLITAN AREA TELEHEALTH 2 | Facility: TELEHEALTH | Age: 67
End: 2022-03-24

## 2022-04-05 LAB
A/G RATIO: 0.8
ALBUMIN: 3.7
ALKALINE PHOSPHATASE: 320
ALT (SGPT): 106
AST (SGOT): 106
BILIRUBIN, TOTAL: 0.4
BUN/CREATININE RATIO: 14
BUN: 11
CALCIUM: 9.3
CARBON DIOXIDE, TOTAL: 21
CHLORIDE: 99
CREATININE: 0.76
EGFR: 86
GLOBULIN, TOTAL: 4.7
GLUCOSE: 74
IMMUNOGLOBULIN G, QN, SERUM: 3365
INR: 0.9
POTASSIUM: 4.6
PROTEIN, TOTAL: 8.4
PROTHROMBIN TIME: 10
SODIUM: 133

## 2022-04-07 ENCOUNTER — TELEPHONE ENCOUNTER (OUTPATIENT)
Dept: URBAN - METROPOLITAN AREA CLINIC 92 | Facility: CLINIC | Age: 67
End: 2022-04-07

## 2022-04-13 ENCOUNTER — OFFICE VISIT (OUTPATIENT)
Dept: URBAN - METROPOLITAN AREA TELEHEALTH 2 | Facility: TELEHEALTH | Age: 67
End: 2022-04-13
Payer: MEDICARE

## 2022-04-13 VITALS — WEIGHT: 177 LBS | BODY MASS INDEX: 27.78 KG/M2 | HEIGHT: 67 IN

## 2022-04-13 DIAGNOSIS — D64.9 LOW HEMOGLOBIN: ICD-10-CM

## 2022-04-13 DIAGNOSIS — Z71.85 VACCINE COUNSELING: ICD-10-CM

## 2022-04-13 DIAGNOSIS — R76.0 ABNORMAL ANTIBODY TITER: ICD-10-CM

## 2022-04-13 DIAGNOSIS — K74.69 OTHER CIRRHOSIS OF LIVER: ICD-10-CM

## 2022-04-13 DIAGNOSIS — Z78.9 HEPATITIS B CORE ANTIBODY NEGATIVE: ICD-10-CM

## 2022-04-13 DIAGNOSIS — Z79.899 HIGH RISK MEDICATION USE: ICD-10-CM

## 2022-04-13 DIAGNOSIS — Z98.890 HISTORY OF LIVER BIOPSY: ICD-10-CM

## 2022-04-13 DIAGNOSIS — K76.0 FATTY LIVER: ICD-10-CM

## 2022-04-13 PROCEDURE — 99443 PHONE E/M BY PHYS 21-30 MIN: CPT | Performed by: PHYSICIAN ASSISTANT

## 2022-04-13 RX ORDER — PREDNISONE 5 MG/1
TABLET ORAL
Qty: 30 | Status: ON HOLD | COMMUNITY

## 2022-04-13 RX ORDER — NAPROXEN 500 MG/1
TABLET ORAL
Qty: 60 | Status: ACTIVE | COMMUNITY

## 2022-04-13 RX ORDER — MINOCYCLINE HYDROCHLORIDE 100 MG/1
1 CAPSULE CAPSULE ORAL
Status: ON HOLD | COMMUNITY

## 2022-04-13 RX ORDER — ALPRAZOLAM 0.5 MG/1
TABLET ORAL
Qty: 90 | Status: ACTIVE | COMMUNITY

## 2022-04-13 RX ORDER — OXCARBAZEPINE 150 MG/1
1 TABLET TABLET, FILM COATED ORAL TWICE A DAY
Status: ACTIVE | COMMUNITY

## 2022-04-13 RX ORDER — LISINOPRIL 40 MG/1
1 TABLET TABLET ORAL ONCE A DAY
Status: ACTIVE | COMMUNITY

## 2022-04-13 RX ORDER — ROSUVASTATIN CALCIUM 10 MG/1
1 TABLET TABLET, FILM COATED ORAL ONCE A DAY
Status: ON HOLD | COMMUNITY

## 2022-04-13 RX ORDER — AMLODIPINE BESYLATE 2.5 MG
1 TABLET TABLET ORAL ONCE A DAY
Status: ACTIVE | COMMUNITY

## 2022-04-13 RX ORDER — LEVOTHYROXINE SODIUM 125 UG/1
TABLET ORAL
Qty: 30 | Status: ACTIVE | COMMUNITY

## 2022-04-13 RX ORDER — TIZANIDINE 4 MG/1
TAKE 1 TABLET TWICE A DAY TABLET ORAL
Qty: 30 | Refills: 0 | Status: ACTIVE | COMMUNITY

## 2022-04-13 RX ORDER — DULOXETINE 60 MG/1
TAKE ONE CAPSULE ONCE A DAY CAPSULE, DELAYED RELEASE ORAL
Qty: 30 | Refills: 6 | Status: ACTIVE | COMMUNITY

## 2022-04-13 NOTE — HPI-TODAY'S VISIT:
Patient was referred by Dr. Esme Manning for an evaluation of fatty liver.  A copy of this note will be sent to the referring provider.                     Pt here for telephone visit since TH did not work. she is having htn meds adjusted due to hypo-hypertension issues. still have not gotten liver bx slides sent to us and advised her to go to hospital to have this sent to us.  doing mri through [place] tomorrow[facility]typically eye drops that are given do not effect lfts.  RECAP: She stopped cymbalta 2/2/22  and could only be off for 3 days and then had bad panic attacks and needed to restart.   feb 2022 u/s shows: Liver appearing normal, no liver lesion noted.  Pancreas normal, spleen normal in size.  Patent hepatic vasculature.  Common bile duct measures 5.6 mm which is within normal limits for postcholecystectomy state  Pt was dx with covid on 1/25, had symptoms the week prior. stopped doxy on 1/26, she has one more day of prednisone dose.  went to the hospital and received infusion of MAB last week.  she had symptoms around the time she did our labs. suspect bump from this and/or DILI from doxy and cymbalta. since she's been off of doxy, recommend she avoid this and recommend shes top cymbalta as well. will also set her up to do an u/s with doppler now assessing vessel patency. since friday will be day 10 after covid exposure, will have her do labs then.   was noted to have cirrhosis noted on prior CT scan.  she had noted abnormal lfts approx 6 months ago.    She is currently being treated for an autoimmune skin condition(bullous pemphigoid) now off of prednisone which can cause abnormal lfts has been on doxy 100mg for this for 1 year, need to monitor as this can caues abnormal lfts. also on cymbalta which can cause abnormal lfts.  derm asked about dupilumab which is category E. will check hep b core ab status.  Hepatotoxicity In several large randomized, placebo controlled trials, serum enzyme and bilirubin levels did not change during dupilumab therapy and there were no reported liver related serious adverse events or causes for early discontinuation. There have been no published reports of clinically apparent acute liver injury attributed to dupilumab therapy, but it has had limited general clinical use. Thus, liver injury from dupilumab must be rare, if it occurs at all.  Likelihood score: E (unlikely cause of clinically apparent liver injury).  she was taken off of statin rx 2 months ago.  she had labs done by pcp in oct 2021-was told lfts were abnormal  Liver biopsy from August 2021 shows mild fatty change, minimal chronic hepatitis grade 1, moderate fibrosis, stage II.  Under the description it states there is moderate degree of periportal fibrosis with few bridging portal to portal fibrosis.  Minimal degree of lobular inflammation is noted with rare spotty necrosis.  Mild microvesicular fatty change less than 20% is noted.  Clinical laboratory and serologic findings is suggested. will have this path reviewed by dr. paul

## 2022-04-17 ENCOUNTER — TELEPHONE ENCOUNTER (OUTPATIENT)
Dept: URBAN - METROPOLITAN AREA CLINIC 92 | Facility: CLINIC | Age: 67
End: 2022-04-17

## 2022-04-17 NOTE — HPI-TODAY'S VISIT:
Dear Alice Grayson, April 14 MRI back today. Some motion noted during exam but limited it somewhat. Lower thorax shows heart top normal.  Bibasilar atelectasis and scarring seen.  Please share with primary provider. Liver fat noted to be elevated at 9.79 up to 11.83%.  Please see chronic liver disease changes including lobar redistribution and nodular contour.  No definite liver cancer signs.  Liver vessels patent.  They do see signs of fibrosis noted. Prior cholecystectomy changes seen. Spleen normal. Some prominent portacaval lymph nodes noted measuring 1.5 x 2.7 and another measuring 0.9 x 1.7 cm and nonspecific and may be related to your liver disease. Moderate atherosclerotic disease seen but without aortic aneurysm.  Please share with primary provider.  You do have a history of hyperlipidemia and so that obviously can add to the wrist to have atherosclerosis.  I does appear that you are also on treatment for same. Degenerative changes seen of your spine. Jasmina spoke with the local team at your local [facility] office and they are helping to get the pathology slides sent over. Dr. Magana

## 2022-04-19 ENCOUNTER — TELEPHONE ENCOUNTER (OUTPATIENT)
Dept: URBAN - METROPOLITAN AREA CLINIC 92 | Facility: CLINIC | Age: 67
End: 2022-04-19

## 2022-05-24 ENCOUNTER — TELEPHONE ENCOUNTER (OUTPATIENT)
Dept: URBAN - METROPOLITAN AREA CLINIC 92 | Facility: CLINIC | Age: 67
End: 2022-05-24

## 2022-05-24 ENCOUNTER — LAB OUTSIDE AN ENCOUNTER (OUTPATIENT)
Dept: URBAN - METROPOLITAN AREA TELEHEALTH 2 | Facility: TELEHEALTH | Age: 67
End: 2022-05-24

## 2022-05-24 ENCOUNTER — OFFICE VISIT (OUTPATIENT)
Dept: URBAN - METROPOLITAN AREA TELEHEALTH 2 | Facility: TELEHEALTH | Age: 67
End: 2022-05-24
Payer: MEDICARE

## 2022-05-24 DIAGNOSIS — Z78.9 HEPATITIS B CORE ANTIBODY NEGATIVE: ICD-10-CM

## 2022-05-24 DIAGNOSIS — Z98.890 HISTORY OF LIVER BIOPSY: ICD-10-CM

## 2022-05-24 DIAGNOSIS — D64.9 LOW HEMOGLOBIN: ICD-10-CM

## 2022-05-24 DIAGNOSIS — Z71.89 VACCINE COUNSELING: ICD-10-CM

## 2022-05-24 DIAGNOSIS — E78.2 HYPERLIPIDEMIA, MIXED: ICD-10-CM

## 2022-05-24 DIAGNOSIS — R76.0 ABNORMAL ANTIBODY TITER: ICD-10-CM

## 2022-05-24 DIAGNOSIS — K74.69 CIRRHOSIS, CRYPTOGENIC: ICD-10-CM

## 2022-05-24 DIAGNOSIS — Z71.85 VACCINE COUNSELING: ICD-10-CM

## 2022-05-24 DIAGNOSIS — K76.0 FATTY LIVER: ICD-10-CM

## 2022-05-24 DIAGNOSIS — I70.0 ATHEROSCLEROSIS OF AORTA: ICD-10-CM

## 2022-05-24 DIAGNOSIS — Z79.899 HIGH RISK MEDICATION USE: ICD-10-CM

## 2022-05-24 DIAGNOSIS — K74.02 HEPATIC FIBROSIS, ADVANCED FIBROSIS: ICD-10-CM

## 2022-05-24 PROBLEM — 3723001 ARTHRITIS: Status: ACTIVE | Noted: 2022-05-24

## 2022-05-24 PROBLEM — 55822004 HYPERLIPIDEMIA: Status: ACTIVE | Noted: 2022-05-24

## 2022-05-24 PROBLEM — 13645005 COPD - CHRONIC OBSTRUCTIVE PULMONARY DISEASE: Status: ACTIVE | Noted: 2022-05-24

## 2022-05-24 PROCEDURE — 99215 OFFICE O/P EST HI 40 MIN: CPT

## 2022-05-24 RX ORDER — LEVOTHYROXINE SODIUM 125 UG/1
1 TABLET IN THE MORNING ON AN EMPTY STOMACH TABLET ORAL ONCE A DAY
Status: ACTIVE | COMMUNITY

## 2022-05-24 RX ORDER — AMLODIPINE BESYLATE 2.5 MG
1 TABLET TABLET ORAL ONCE A DAY
Status: ACTIVE | COMMUNITY

## 2022-05-24 RX ORDER — PREDNISONE 5 MG/1
TABLET ORAL
Qty: 30 | Status: DISCONTINUED | COMMUNITY

## 2022-05-24 RX ORDER — ROSUVASTATIN CALCIUM 10 MG/1
1 TABLET TABLET, FILM COATED ORAL ONCE A DAY
Status: ON HOLD | COMMUNITY

## 2022-05-24 RX ORDER — MINOCYCLINE HYDROCHLORIDE 100 MG/1
1 CAPSULE CAPSULE ORAL
Status: DISCONTINUED | COMMUNITY

## 2022-05-24 RX ORDER — DULOXETINE 60 MG/1
1 CAPSULE CAPSULE, DELAYED RELEASE ORAL
Refills: 6 | Status: ACTIVE | COMMUNITY

## 2022-05-24 RX ORDER — URSODIOL 300 MG/1
1 CAPSULE CAPSULE ORAL
Qty: 60 | Refills: 1

## 2022-05-24 RX ORDER — LISINOPRIL 40 MG/1
1 TABLET TABLET ORAL ONCE A DAY
Status: ACTIVE | COMMUNITY

## 2022-05-24 RX ORDER — TIZANIDINE 4 MG/1
TAKE 1 TABLET TWICE A DAY TABLET ORAL
Qty: 30 | Refills: 0 | Status: DISCONTINUED | COMMUNITY

## 2022-05-24 RX ORDER — NAPROXEN 500 MG/1
1 TABLET WITH FOOD OR MILK TABLET ORAL
Status: ACTIVE | COMMUNITY

## 2022-05-24 RX ORDER — OXCARBAZEPINE 150 MG/1
1 TABLET TABLET, FILM COATED ORAL TWICE A DAY
Status: ACTIVE | COMMUNITY

## 2022-05-24 RX ORDER — ALPRAZOLAM 0.5 MG/1
1 TABLET TABLET ORAL
Status: ACTIVE | COMMUNITY

## 2022-05-24 RX ORDER — URSODIOL 300 MG/1
1 CAPSULE CAPSULE ORAL
Qty: 60 | Refills: 1 | OUTPATIENT

## 2022-05-24 NOTE — HPI-TODAY'S VISIT:
Patient is a 66 yo female was referred by Dr. Esme Manning and last seen April 2022 for an evaluation of fatty liver and mri here is now suggesting nodular liver.    A copy of this note will be sent to the referring provider.   April 2022 local labs sent to us. Glucose was 101 is slightly up uric acid 4.9 BUN is 16 creatinine 0.71 sodium 135 potassium 4.6 chloride 101 calcium 9.3 albumin slightly low at 3.3 bilirubin 0.4 alkaline phosphatase 234.   , .  Glycerides 117 cholesterol 184.  HDL low at 36.  LDL elevated at 127.  Thyroxine T4 was 13.8 elevated.  White cell count 5.0 hemoglobin low at 9.8 hematocrit normal 35.4 MCV 87.  Platelet count 381.  Neutrophils 4.5 lymphocytes 1.8 eosinophils up at 0.1.  Mentioned that she should she follow with primary md re the anemia. She is now also on iron and she needs to be sure up to date on scopes. Rich and sees Kianna there re the anemia.  The platelets are still normal so this is good sign  April 14 MRI  Some motion noted during exam but limited it somewhat. Lower thorax shows heart top normal.  Bibasilar atelectasis and scarring seen.  Please share with primary provider. Liver fat noted to be elevated at 9.79 up to 11.83%. Normal is 6%   Please see chronic liver disease changes including lobar redistribution and nodular contour.  No definite liver cancer signs.  Liver vessels patent.  They do see signs of fibrosis noted. Prior cholecystectomy changes seen. Spleen normal. Some prominent portacaval lymph nodes noted measuring 1.5 x 2.7 and another measuring 0.9 x 1.7 cm and nonspecific and may be related to your liver disease. Moderate atherosclerotic disease seen but without aortic aneurysm.  Please share with primary provider.  You do have a history of hyperlipidemia and so that obviously can add to the wrist to have atherosclerosis.  I does appear that you are also on treatment for same. She was on crestor and prior held 2m ago and not on it and she may want to relook at that. Degenerative changes seen of your spine.  Jasmina spoke with the local team at your local [facility] office and they are helping to get the pathology slides sent.  Jasmina saw prior for TH did not work. Jasmina spoke with kianna to get that info.  She did her cataract surgery and that went well.  She stopped cymbalta 2/2/22  and could only be off for 3 days and then had bad panic attacks and needed to restart.  She was still on it and she said she went to wellness check and is trying to wean her off and down to 1 po qd x 2 days and trying to wean it. She wants to try to paxil.  Feb 2022 u/s shows: Liver appearing normal, no liver lesion noted.  Pancreas normal, spleen normal in size.  Patent hepatic vasculature.  Common bile duct measures 5.6 mm which is within normal limits for postcholecystectomy state  Pt was dx with covid on 1/25, had symptoms the week prior. stopped doxy on 1/26, she has one more day of prednisone dose.  went to the hospital and received infusion of MAB last week.  She had symptoms around the time she did our labs. Suspect bump from this and/or DILI from doxy and cymbalta. Since she's been off of doxy, recommend she avoid this and recommend she stop cymbalta as well. will also set her up to do an u/s with doppler now assessing vessel patency.   This thursday has mammogram. She is doing ct of lungs for a spot follow up and was due for that.  She is currently being treated for an autoimmune skin condition(bullous pemphigoid) now off of prednisone which can cause abnormal lfts and not on meds. Prior had been on doxy 100mg for this for 1 year and steroids and could have added to liver issues. Both can inc the lfts and trigger issues.  Derm asked about dupilumab which is category E. will check hep b core ab status.  Hepatotoxicity In several large randomized, placebo controlled trials, serum enzyme and bilirubin levels did not change during dupilumab therapy and there were no reported liver related serious adverse events or causes for early discontinuation. There have been no published reports of clinically apparent acute liver injury attributed to dupilumab therapy, but it has had limited general clinical use. Thus, liver injury from dupilumab must be rare, if it occurs at all. Likelihood score: E (unlikely cause of clinically apparent liver injury).  She was taken off of statin rx 2 months prior to visit here.    She had labs done by pcp in oct 2021-was told lfts were abnormal   Liver biopsy from August 2021 shows mild fatty change, minimal chronic hepatitis grade 1, moderate fibrosis, stage II.  Under the description it states there is moderate degree of periportal fibrosis with few bridging portal to portal fibrosis.  Minimal degree of lobular inflammation is noted with rare spotty necrosis.  Mild microvesicular fatty change less than 20% is noted.  Clinical laboratory and serologic findings is suggested. will have this path reviewed by dr. mueller   Lost 190 to 163.  Plan: 1. We need to follow as wean off the cymbalta, 2,  If labs lower need to look at cholesterol meds as fatty liver tx required lipid tx. 3. Need to see if off the doxycycline and steroids. 4. Dr Mueller did not get the slides sent to her. 5. She will follow [place] office for the anemia and iron issues and to be sure up to date on the scopes. 6. Need to work on the diet and weight loss and exercise. She says she is trying to eat better and not eating fried. 190 weight to 163.  Stressed to pt the need for social distancing and strict handwashing and wearing a mask and to follow any other new or added CDC recommendations as this is an evolving target.  Duration of the visit was 53 minutes with 10 minutes of chart prep and 43 minutes by dox video x 2 sessions today for this TeleMed visit with time reviewing their prior and recent records and labs and discussing their current status and future plans for care for the patient.

## 2022-05-31 ENCOUNTER — TELEPHONE ENCOUNTER (OUTPATIENT)
Dept: URBAN - METROPOLITAN AREA CLINIC 86 | Facility: CLINIC | Age: 67
End: 2022-05-31

## 2022-06-13 ENCOUNTER — OFFICE VISIT (OUTPATIENT)
Dept: URBAN - METROPOLITAN AREA CLINIC 70 | Facility: CLINIC | Age: 67
End: 2022-06-13
Payer: MEDICARE

## 2022-06-13 VITALS
BODY MASS INDEX: 24.99 KG/M2 | HEART RATE: 83 BPM | TEMPERATURE: 98.7 F | HEIGHT: 67 IN | WEIGHT: 159.2 LBS | SYSTOLIC BLOOD PRESSURE: 167 MMHG | DIASTOLIC BLOOD PRESSURE: 73 MMHG

## 2022-06-13 DIAGNOSIS — R74.8 ELEVATED LIVER ENZYMES: ICD-10-CM

## 2022-06-13 DIAGNOSIS — K76.0 FATTY LIVER: ICD-10-CM

## 2022-06-13 DIAGNOSIS — D50.9 IRON DEFICIENCY ANEMIA: ICD-10-CM

## 2022-06-13 DIAGNOSIS — K29.50 OTHER CHRONIC GASTRITIS, PRESENCE OF BLEEDING UNSPECIFIED: ICD-10-CM

## 2022-06-13 PROCEDURE — 99214 OFFICE O/P EST MOD 30 MIN: CPT | Performed by: REGISTERED NURSE

## 2022-06-13 RX ORDER — DULOXETINE 60 MG/1
1 CAPSULE CAPSULE, DELAYED RELEASE ORAL
Refills: 6 | Status: DISCONTINUED | COMMUNITY

## 2022-06-13 RX ORDER — LEVOTHYROXINE SODIUM 125 UG/1
1 TABLET IN THE MORNING ON AN EMPTY STOMACH TABLET ORAL ONCE A DAY
Status: ACTIVE | COMMUNITY

## 2022-06-13 RX ORDER — ROSUVASTATIN CALCIUM 10 MG/1
1 TABLET TABLET, FILM COATED ORAL ONCE A DAY
Status: DISCONTINUED | COMMUNITY

## 2022-06-13 RX ORDER — PANTOPRAZOLE SODIUM 40 MG/1
1 TABLET TABLET, DELAYED RELEASE ORAL ONCE A DAY
Qty: 90 TABLET | Refills: 3 | OUTPATIENT
Start: 2022-06-13

## 2022-06-13 RX ORDER — ALPRAZOLAM 0.5 MG/1
1 TABLET TABLET ORAL
Status: ACTIVE | COMMUNITY

## 2022-06-13 RX ORDER — URSODIOL 300 MG/1
1 CAPSULE CAPSULE ORAL
Qty: 60 | Refills: 1 | Status: ACTIVE | COMMUNITY

## 2022-06-13 RX ORDER — TRAMADOL HYDROCHLORIDE 50 MG/1
1 TABLET AS NEEDED TABLET, FILM COATED ORAL ONCE A DAY
Status: ACTIVE | COMMUNITY

## 2022-06-13 RX ORDER — URSODIOL 300 MG/1
1 CAPSULE CAPSULE ORAL
OUTPATIENT

## 2022-06-13 RX ORDER — FERROUS SULFATE 325(65) MG
1 TABLET TABLET ORAL ONCE A DAY
Status: ACTIVE | COMMUNITY

## 2022-06-13 RX ORDER — OXCARBAZEPINE 150 MG/1
1 TABLET TABLET, FILM COATED ORAL TWICE A DAY
Status: DISCONTINUED | COMMUNITY

## 2022-06-13 RX ORDER — OXCARBAZEPINE 150 MG/1
1 TABLET TABLET, FILM COATED ORAL TWICE A DAY
Status: ACTIVE | COMMUNITY

## 2022-06-13 RX ORDER — AMLODIPINE BESYLATE 2.5 MG
1 TABLET TABLET ORAL ONCE A DAY
Status: ACTIVE | COMMUNITY

## 2022-06-13 RX ORDER — NAPROXEN 500 MG/1
1 TABLET WITH FOOD OR MILK TABLET ORAL
Status: DISCONTINUED | COMMUNITY

## 2022-06-13 RX ORDER — LISINOPRIL 40 MG/1
1 TABLET TABLET ORAL ONCE A DAY
Status: ACTIVE | COMMUNITY

## 2022-06-13 RX ORDER — LISINOPRIL 40 MG/1
1 TABLET TABLET ORAL ONCE A DAY
Status: DISCONTINUED | COMMUNITY

## 2022-06-13 NOTE — HPI-TODAY'S VISIT:
Pt being followed by Dr Magana for LFT elevation. Still awaiting second opinion on pathology from liver biopsy.  She was asked to f/u with us regarding KULWANT.  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 She has not been taking a PPI. She has no signs of active GI bleeding. She is scheduled to see her PCP tomorrow for repeat labs.

## 2022-06-21 ENCOUNTER — OFFICE VISIT (OUTPATIENT)
Dept: URBAN - METROPOLITAN AREA CLINIC 70 | Facility: CLINIC | Age: 67
End: 2022-06-21

## 2022-06-21 ENCOUNTER — LAB OUTSIDE AN ENCOUNTER (OUTPATIENT)
Dept: URBAN - METROPOLITAN AREA TELEHEALTH 2 | Facility: TELEHEALTH | Age: 67
End: 2022-06-21

## 2022-07-24 ENCOUNTER — LAB OUTSIDE AN ENCOUNTER (OUTPATIENT)
Dept: URBAN - METROPOLITAN AREA TELEHEALTH 2 | Facility: TELEHEALTH | Age: 67
End: 2022-07-24

## 2022-08-03 ENCOUNTER — OFFICE VISIT (OUTPATIENT)
Dept: URBAN - METROPOLITAN AREA TELEHEALTH 2 | Facility: TELEHEALTH | Age: 67
End: 2022-08-03
Payer: MEDICARE

## 2022-08-03 ENCOUNTER — LAB OUTSIDE AN ENCOUNTER (OUTPATIENT)
Dept: URBAN - METROPOLITAN AREA TELEHEALTH 2 | Facility: TELEHEALTH | Age: 67
End: 2022-08-03

## 2022-08-03 VITALS — WEIGHT: 162 LBS | HEIGHT: 67 IN | BODY MASS INDEX: 25.43 KG/M2

## 2022-08-03 DIAGNOSIS — Z79.899 HIGH RISK MEDICATION USE: ICD-10-CM

## 2022-08-03 DIAGNOSIS — K74.02 HEPATIC FIBROSIS, ADVANCED FIBROSIS: ICD-10-CM

## 2022-08-03 DIAGNOSIS — K76.0 FATTY LIVER: ICD-10-CM

## 2022-08-03 DIAGNOSIS — K74.60 CIRRHOSIS OF LIVER WITHOUT ASCITES, UNSPECIFIED HEPATIC CIRRHOSIS TYPE: ICD-10-CM

## 2022-08-03 PROBLEM — 416940007: Status: ACTIVE | Noted: 2022-05-24

## 2022-08-03 PROBLEM — 409063005 COUNSELING: Status: ACTIVE | Noted: 2022-05-24

## 2022-08-03 PROBLEM — 49218002 HIP PAIN: Status: ACTIVE | Noted: 2022-05-24

## 2022-08-03 PROBLEM — 161646004 H/O: HIGH RISK MEDICATION: Status: ACTIVE | Noted: 2022-05-24

## 2022-08-03 PROCEDURE — 99214 OFFICE O/P EST MOD 30 MIN: CPT

## 2022-08-03 RX ORDER — URSODIOL 300 MG/1
1 CAPSULE CAPSULE ORAL
Qty: 60 | Refills: 3 | OUTPATIENT

## 2022-08-03 RX ORDER — TRAMADOL HYDROCHLORIDE 50 MG/1
1 TABLET AS NEEDED TABLET, FILM COATED ORAL ONCE A DAY
Status: ACTIVE | COMMUNITY

## 2022-08-03 RX ORDER — LEVOTHYROXINE SODIUM 125 UG/1
1 TABLET IN THE MORNING ON AN EMPTY STOMACH TABLET ORAL ONCE A DAY
Status: ACTIVE | COMMUNITY

## 2022-08-03 RX ORDER — URSODIOL 300 MG/1
1 CAPSULE CAPSULE ORAL
Status: ACTIVE | COMMUNITY

## 2022-08-03 RX ORDER — LISINOPRIL 40 MG/1
1 TABLET TABLET ORAL ONCE A DAY
Status: ACTIVE | COMMUNITY

## 2022-08-03 RX ORDER — OXCARBAZEPINE 150 MG/1
1 TABLET TABLET, FILM COATED ORAL TWICE A DAY
Status: ACTIVE | COMMUNITY

## 2022-08-03 RX ORDER — CLINDAMYCIN HYDROCHLORIDE 150 MG/1
3 CAPSULES CAPSULE ORAL
Status: ACTIVE | COMMUNITY

## 2022-08-03 RX ORDER — PANTOPRAZOLE SODIUM 40 MG/1
1 TABLET TABLET, DELAYED RELEASE ORAL ONCE A DAY
Qty: 90 TABLET | Refills: 3 | Status: ACTIVE | COMMUNITY
Start: 2022-06-13

## 2022-08-03 RX ORDER — ALPRAZOLAM 0.5 MG/1
1 TABLET TABLET ORAL
Status: ACTIVE | COMMUNITY

## 2022-08-03 RX ORDER — FERROUS SULFATE 325(65) MG
1 TABLET TABLET ORAL ONCE A DAY
Status: ACTIVE | COMMUNITY

## 2022-08-03 RX ORDER — OXYCODONE HYDROCHLORIDE 5 MG/1
1 TABLET AS NEEDED TABLET ORAL
Status: ACTIVE | COMMUNITY

## 2022-08-03 RX ORDER — AMLODIPINE BESYLATE 2.5 MG
1 TABLET TABLET ORAL ONCE A DAY
Status: ACTIVE | COMMUNITY

## 2022-08-03 NOTE — EXAM-PHYSICAL EXAM
Telemed self reported:  Gen: no distress. Eyes: no jaundice. Mouth: no thrush. CV: no chest pain. Resp: no wheezes. Abd: no pain. Ext: no edema.	 Neuro: no weakness. Musculo: left hip pain getting better from surgery

## 2022-08-03 NOTE — HPI-TODAY'S VISIT:
Patient is a 68 yo female was referred by Dr. Esme Manning and last seen May  2022 for an evaluation of fatty liver and mri here is now suggesting nodular liver.    A copy of this note will be sent to the referring provider.   Pt since had hip surgery and she did labs at the [facility] and [facility].  April 2022 local labs sent to us. Glucose was 101 is slightly up uric acid 4.9 BUN is 16 creatinine 0.71 sodium 135 potassium 4.6 chloride 101 calcium 9.3 albumin slightly low at 3.3 bilirubin 0.4 alkaline phosphatase 234.   , .  Glycerides 117 cholesterol 184.  HDL low at 36.  LDL elevated at 127.  Thyroxine T4 was 13.8 elevated.  White cell count 5.0 hemoglobin low at 9.8 hematocrit normal 35.4 MCV 87.  Platelet count 381.  Neutrophils 4.5 lymphocytes 1.8 eosinophils up at 0.1.  She had surgery July 26 of the hip was. Primary did labs and she is on iron.  Rich and sees Kianna there re the anemia. No scopes set up and she did set up and trying to get the slides.  April 14 MRI  Some motion noted during exam but limited it somewhat. Lower thorax shows heart top normal.  Bibasilar atelectasis and scarring seen.  Please share with primary provider. Liver fat noted to be elevated at 9.79 up to 11.83%. Normal is 6%   Please see chronic liver disease changes including lobar redistribution and nodular contour.  No definite liver cancer signs.  Liver vessels patent.  They do see signs of fibrosis noted. Prior cholecystectomy changes seen. Spleen normal. Some prominent portacaval lymph nodes noted measuring 1.5 x 2.7 and another measuring 0.9 x 1.7 cm and nonspecific and may be related to your liver disease. Moderate atherosclerotic disease seen but without aortic aneurysm.  Please share with primary provider.  You do have a history of hyperlipidemia and so that obviously can add to the wrist to have atherosclerosis.  I does appear that you are also on treatment for same. She was on crestor and prior held 2m ago and not on it and she may want to relook at that. Degenerative changes seen of your spine.  Asked geraldine to check with jaclyn re the liver pathology slides sent.  She did her cataract surgery and that went well and uses glasses to read.  Clindamycin taking for post op.  She stopped cymbalta 2/2/22  and could only be off for 3 days and then had bad panic attacks and needed to restart.  She was still on it and she said she went to wellness check and is trying to wean her off and down to 1 po qd x 2 days and trying to weaned off and now on paxil.  Feb 2022 u/s shows: Liver appearing normal, no liver lesion noted.  Pancreas normal, spleen normal in size.  Patent hepatic vasculature.  Common bile duct measures 5.6 mm which is within normal limits for postcholecystectomy state  Pt was dx with covid on 1/25, had symptoms the week prior. stopped doxy on 1/26, she has one more day of prednisone dose.  went to the hospital and received infusion of MAB last week.  She had symptoms around the time she did our labs. Suspect bump from this and/or DILI from doxy and cymbalta. Since she's been off of doxy, recommend she avoid this and recommend she stop cymbalta as well. will also set her up to do an u/s with doppler now assessing vessel patency.  She has not done covid 19 vaccine.  Xarelto for blood thinner 10mg for 30 days.  This thursday has mammogram. She is doing ct of lungs for a spot follow up and was due for that.  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 Prior had been on doxy 100mg for this for 1 year and steroids and could have added to liver issues. Both can inc the lfts and trigger issues.  Derm asked about dupilumab which is category E. will check hep b core ab status.  She never the dupixent. Hepatotoxicity In several large randomized, placebo controlled trials, serum enzyme and bilirubin levels did not change during dupilumab therapy and there were no reported liver related serious adverse events or causes for early discontinuation. There have been no published reports of clinically apparent acute liver injury attributed to dupilumab therapy, but it has had limited general clinical use. Thus, liver injury from dupilumab must be rare, if it occurs at all. Likelihood score: E (unlikely cause of clinically apparent liver injury).  She was taken off of statin rx 2 months prior to visit here.    She had labs done by pcp in oct 2021-was told lfts were abnormal   Liver biopsy from August 2021 shows mild fatty change, minimal chronic hepatitis grade 1, moderate fibrosis, stage II.  Under the description it states there is moderate degree of periportal fibrosis with few bridging portal to portal fibrosis.  Minimal degree of lobular inflammation is noted with rare spotty necrosis.  Mild microvesicular fatty change less than 20% is noted.  Clinical laboratory and serologic findings is suggested. will have this path reviewed by dr. mueller   Lost 190 to 163 april and she in aug is at 163.  Plan: 1. We need to do liver labs. 2.  She is getting over the hip surgery and so that will help with exercise. 3.  SHe is working on diet and exercise and doing therapy. 4.  CHecking on the bx with Dr Mueller. 5.  See post th emri in oct to see how doing.    Stressed to pt the need for social distancing and strict handwashing and wearing a mask and to follow any other new or added CDC recommendations as this is an evolving target.  Duration of the visit was 31 minutes with 10 minutes of chart prep and 21 minutes from 1002 to 1023 am by clock as healow clock off due to fluxes for this TeleMed visit with time reviewing their prior and recent records and labs and discussing their current status and future plans for care for the patient.

## 2022-10-23 PROBLEM — 698453009 HISTORY OF REPAIR OF HIP JOINT: Status: ACTIVE | Noted: 2022-08-03

## 2022-10-23 PROBLEM — 87522002 IRON DEFICIENCY ANEMIA: Status: ACTIVE | Noted: 2022-05-24

## 2022-10-23 PROBLEM — 197321007: Status: ACTIVE | Noted: 2022-01-18

## 2022-10-23 PROBLEM — 38341003 HYPERTENSION: Status: ACTIVE | Noted: 2022-05-24

## 2022-10-23 PROBLEM — 267434003 MIXED HYPERLIPIDEMIA: Status: ACTIVE | Noted: 2022-05-24

## 2022-10-23 PROBLEM — 81817003 ATHEROSCLEROSIS OF AORTA: Status: ACTIVE | Noted: 2022-05-24

## 2022-11-03 ENCOUNTER — OFFICE VISIT (OUTPATIENT)
Dept: URBAN - METROPOLITAN AREA CLINIC 86 | Facility: CLINIC | Age: 67
End: 2022-11-03

## 2022-11-03 ENCOUNTER — LAB OUTSIDE AN ENCOUNTER (OUTPATIENT)
Dept: URBAN - METROPOLITAN AREA TELEHEALTH 2 | Facility: TELEHEALTH | Age: 67
End: 2022-11-03

## 2022-11-03 RX ORDER — AMLODIPINE BESYLATE 2.5 MG
1 TABLET TABLET ORAL ONCE A DAY
Status: ACTIVE | COMMUNITY

## 2022-11-03 RX ORDER — FERROUS SULFATE 325(65) MG
1 TABLET TABLET ORAL ONCE A DAY
Status: ACTIVE | COMMUNITY

## 2022-11-03 RX ORDER — LEVOTHYROXINE SODIUM 125 UG/1
1 TABLET IN THE MORNING ON AN EMPTY STOMACH TABLET ORAL ONCE A DAY
Status: ACTIVE | COMMUNITY

## 2022-11-03 RX ORDER — PANTOPRAZOLE SODIUM 40 MG/1
1 TABLET TABLET, DELAYED RELEASE ORAL ONCE A DAY
Qty: 90 TABLET | Refills: 3 | Status: ACTIVE | COMMUNITY
Start: 2022-06-13

## 2022-11-03 RX ORDER — ALPRAZOLAM 0.5 MG/1
1 TABLET TABLET ORAL
Status: ACTIVE | COMMUNITY

## 2022-11-03 RX ORDER — LISINOPRIL 40 MG/1
1 TABLET TABLET ORAL ONCE A DAY
Status: ACTIVE | COMMUNITY

## 2022-11-03 RX ORDER — URSODIOL 300 MG/1
1 CAPSULE CAPSULE ORAL
Qty: 60 | Refills: 3 | Status: ACTIVE | COMMUNITY

## 2022-11-03 RX ORDER — URSODIOL 300 MG/1
1 CAPSULE CAPSULE ORAL
Qty: 60 | Refills: 3 | OUTPATIENT

## 2022-11-03 RX ORDER — CLINDAMYCIN HYDROCHLORIDE 150 MG/1
3 CAPSULES CAPSULE ORAL
Status: ACTIVE | COMMUNITY

## 2022-11-03 RX ORDER — OXYCODONE HYDROCHLORIDE 5 MG/1
1 TABLET AS NEEDED TABLET ORAL
Status: ACTIVE | COMMUNITY

## 2022-11-03 RX ORDER — OXCARBAZEPINE 150 MG/1
1 TABLET TABLET, FILM COATED ORAL TWICE A DAY
Status: ACTIVE | COMMUNITY

## 2022-11-03 RX ORDER — TRAMADOL HYDROCHLORIDE 50 MG/1
1 TABLET AS NEEDED TABLET, FILM COATED ORAL ONCE A DAY
Status: ACTIVE | COMMUNITY

## 2022-11-03 NOTE — HPI-TODAY'S VISIT:
Patient is a 66 yo female was referred by Dr. Esme Manning and last seen Aug 2022 for an evaluation of fatty liver and mri here is now suggesting nodular liver.    A copy of this note will be sent to the referring provider.   Pt since had hip surgery and she did labs at the [facility] and [facility].  April 2022 local labs sent to us. Glucose was 101 is slightly up uric acid 4.9 BUN is 16 creatinine 0.71 sodium 135 potassium 4.6 chloride 101 calcium 9.3 albumin slightly low at 3.3 bilirubin 0.4 alkaline phosphatase 234.   , .  Glycerides 117 cholesterol 184.  HDL low at 36.  LDL elevated at 127.  Thyroxine T4 was 13.8 elevated.  White cell count 5.0 hemoglobin low at 9.8 hematocrit normal 35.4 MCV 87.  Platelet count 381.  Neutrophils 4.5 lymphocytes 1.8 eosinophils up at 0.1.  She had surgery July 26 of the hip was. Primary did labs and she is on iron.  [facility] and sees Kianna there re the anemia. No scopes set up and she did set up and trying to get the slides.  April 14 MRI  Some motion noted during exam but limited it somewhat. Lower thorax shows heart top normal.  Bibasilar atelectasis and scarring seen.  Please share with primary provider. Liver fat noted to be elevated at 9.79 up to 11.83%. Normal is 6%   Please see chronic liver disease changes including lobar redistribution and nodular contour.  No definite liver cancer signs.  Liver vessels patent.  They do see signs of fibrosis noted. Prior cholecystectomy changes seen. Spleen normal. Some prominent portacaval lymph nodes noted measuring 1.5 x 2.7 and another measuring 0.9 x 1.7 cm and nonspecific and may be related to your liver disease. Moderate atherosclerotic disease seen but without aortic aneurysm.  Please share with primary provider.  You do have a history of hyperlipidemia and so that obviously can add to the wrist to have atherosclerosis.  I does appear that you are also on treatment for same. She was on crestor and prior held 2m ago and not on it and she may want to relook at that. Degenerative changes seen of your spine.  Asked geraldine to check with jaclyn re the liver pathology slides sent.  She did her cataract surgery and that went well and uses glasses to read.  Clindamycin taking for post op.  She stopped cymbalta 2/2/22  and could only be off for 3 days and then had bad panic attacks and needed to restart.  She was still on it and she said she went to wellness check and is trying to wean her off and down to 1 po qd x 2 days and trying to weaned off and now on paxil.  Feb 2022 u/s shows: Liver appearing normal, no liver lesion noted.  Pancreas normal, spleen normal in size.  Patent hepatic vasculature.  Common bile duct measures 5.6 mm which is within normal limits for postcholecystectomy state  Pt was dx with covid on 1/25, had symptoms the week prior. stopped doxy on 1/26, she has one more day of prednisone dose.  went to the hospital and received infusion of MAB last week.  She had symptoms around the time she did our labs. Suspect bump from this and/or DILI from doxy and cymbalta. Since she's been off of doxy, recommend she avoid this and recommend she stop cymbalta as well. will also set her up to do an u/s with doppler now assessing vessel patency.  She has not done covid 19 vaccine.  Xarelto for blood thinner 10mg for 30 days.  This thursday has mammogram. She is doing ct of lungs for a spot follow up and was due for that.  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 Prior had been on doxy 100mg for this for 1 year and steroids and could have added to liver issues. Both can inc the lfts and trigger issues.  Derm asked about dupilumab which is category E. will check hep b core ab status.  She never the dupixent. Hepatotoxicity In several large randomized, placebo controlled trials, serum enzyme and bilirubin levels did not change during dupilumab therapy and there were no reported liver related serious adverse events or causes for early discontinuation. There have been no published reports of clinically apparent acute liver injury attributed to dupilumab therapy, but it has had limited general clinical use. Thus, liver injury from dupilumab must be rare, if it occurs at all. Likelihood score: E (unlikely cause of clinically apparent liver injury).  She was taken off of statin rx 2 months prior to visit here.    She had labs done by pcp in oct 2021-was told lfts were abnormal   Liver biopsy from August 2021 shows mild fatty change, minimal chronic hepatitis grade 1, moderate fibrosis, stage II.  Under the description it states there is moderate degree of periportal fibrosis with few bridging portal to portal fibrosis.  Minimal degree of lobular inflammation is noted with rare spotty necrosis.  Mild microvesicular fatty change less than 20% is noted.  Clinical laboratory and serologic findings is suggested. will have this path reviewed by dr. mueller   Lost 190 to 163 april and she in aug is at 163.  Plan: 1. We need to do liver labs. 2.  She is getting over the hip surgery and so that will help with exercise. 3.  SHe is working on diet and exercise and doing therapy. 4.  CHecking on the bx with Dr Mueller. 5.  See post th emri in oct to see how doing.    Stressed to pt the need for social distancing and strict handwashing and wearing a mask and to follow any other new or added CDC recommendations as this is an evolving target.  Duration of the visit was  minutes with 10 minutes of chart prep and 21 minutes from 1002 to 1023 am by clock as healow clock off due to fluxes for this TeleMed visit with time reviewing their prior and recent records and labs and discussing their current status and future plans for care for the patient.

## 2022-12-15 ENCOUNTER — ERX REFILL RESPONSE (OUTPATIENT)
Dept: URBAN - METROPOLITAN AREA CLINIC 86 | Facility: CLINIC | Age: 67
End: 2022-12-15

## 2022-12-15 RX ORDER — URSODIOL 300 MG/1
1 CAPSULE CAPSULE ORAL
Qty: 60 | Refills: 3 | OUTPATIENT

## 2022-12-15 RX ORDER — URSODIOL 300 MG/1
TAKE 1 CAPSULE TWICE A DAY WITH FOOD CAPSULE ORAL
Qty: 60 CAPSULE | Refills: 0 | OUTPATIENT

## 2022-12-21 ENCOUNTER — OFFICE VISIT (OUTPATIENT)
Dept: URBAN - METROPOLITAN AREA CLINIC 70 | Facility: CLINIC | Age: 67
End: 2022-12-21
Payer: MEDICARE

## 2022-12-21 VITALS
DIASTOLIC BLOOD PRESSURE: 77 MMHG | HEIGHT: 67 IN | WEIGHT: 160.8 LBS | TEMPERATURE: 97.7 F | SYSTOLIC BLOOD PRESSURE: 133 MMHG | HEART RATE: 81 BPM | BODY MASS INDEX: 25.24 KG/M2

## 2022-12-21 DIAGNOSIS — R10.12 LUQ PAIN: ICD-10-CM

## 2022-12-21 DIAGNOSIS — R74.8 ABNORMAL LIVER ENZYMES: ICD-10-CM

## 2022-12-21 DIAGNOSIS — K74.60 CIRRHOSIS OF LIVER WITHOUT ASCITES, UNSPECIFIED HEPATIC CIRRHOSIS TYPE: ICD-10-CM

## 2022-12-21 DIAGNOSIS — K74.5 BILIARY CIRRHOSIS: ICD-10-CM

## 2022-12-21 DIAGNOSIS — K29.50 OTHER CHRONIC GASTRITIS, PRESENCE OF BLEEDING UNSPECIFIED: ICD-10-CM

## 2022-12-21 DIAGNOSIS — K74.02 HEPATIC FIBROSIS, ADVANCED FIBROSIS: ICD-10-CM

## 2022-12-21 PROCEDURE — 99203 OFFICE O/P NEW LOW 30 MIN: CPT | Performed by: INTERNAL MEDICINE

## 2022-12-21 RX ORDER — OXCARBAZEPINE 150 MG/1
1 TABLET TABLET, FILM COATED ORAL TWICE A DAY
Status: ACTIVE | COMMUNITY

## 2022-12-21 RX ORDER — PANTOPRAZOLE SODIUM 40 MG/1
1 TABLET TABLET, DELAYED RELEASE ORAL ONCE A DAY
Qty: 90 TABLET | Refills: 3 | Status: ACTIVE | COMMUNITY
Start: 2022-06-13

## 2022-12-21 RX ORDER — LEVOTHYROXINE SODIUM 125 UG/1
1 TABLET IN THE MORNING ON AN EMPTY STOMACH TABLET ORAL ONCE A DAY
Status: ACTIVE | COMMUNITY

## 2022-12-21 RX ORDER — CLINDAMYCIN HYDROCHLORIDE 150 MG/1
3 CAPSULES CAPSULE ORAL
Status: ACTIVE | COMMUNITY

## 2022-12-21 RX ORDER — URSODIOL 300 MG/1
TAKE 1 CAPSULE TWICE A DAY WITH FOOD CAPSULE ORAL
Qty: 60 CAPSULE | Refills: 0 | Status: ACTIVE | COMMUNITY

## 2022-12-21 RX ORDER — FERROUS SULFATE 325(65) MG
1 TABLET TABLET ORAL ONCE A DAY
Status: ACTIVE | COMMUNITY

## 2022-12-21 RX ORDER — OXYCODONE HYDROCHLORIDE 5 MG/1
1 TABLET AS NEEDED TABLET ORAL
Status: ACTIVE | COMMUNITY

## 2022-12-21 RX ORDER — ALPRAZOLAM 0.5 MG/1
1 TABLET TABLET ORAL
Status: ACTIVE | COMMUNITY

## 2022-12-21 RX ORDER — AMLODIPINE BESYLATE 2.5 MG
1 TABLET TABLET ORAL ONCE A DAY
Status: ACTIVE | COMMUNITY

## 2022-12-21 RX ORDER — TRAMADOL HYDROCHLORIDE 50 MG/1
1 TABLET AS NEEDED TABLET, FILM COATED ORAL ONCE A DAY
Status: ACTIVE | COMMUNITY

## 2022-12-21 RX ORDER — URSODIOL 300 MG/1
TAKE 1 CAPSULE TWICE A DAY WITH FOOD CAPSULE ORAL
Qty: 60 CAPSULE | Refills: 0

## 2022-12-21 RX ORDER — PANTOPRAZOLE SODIUM 40 MG/1
1 TABLET TABLET, DELAYED RELEASE ORAL ONCE A DAY
Qty: 90 TABLET | Refills: 3
Start: 2022-06-13

## 2022-12-21 RX ORDER — LISINOPRIL 40 MG/1
1 TABLET TABLET ORAL ONCE A DAY
Status: ACTIVE | COMMUNITY

## 2022-12-21 NOTE — HPI-TODAY'S VISIT:
Ms perez is a 67 year old pleasant female previously being followed by Dr Magana for her complex hepatology related issues. Patient has advanced hepatic fibrosis, and there is concern for bilary cirrhosis. she is currently on ursodiol. Patient reports that she is here in clinic as she  wanted to have ursodiol refilled. She is complaining of mild LUQ pain at today's visit. No other cative complaints. She plans to continue following up with Dr Magana. Previous endoscopic and imaging data reviewed.

## 2023-01-03 ENCOUNTER — TELEPHONE ENCOUNTER (OUTPATIENT)
Dept: URBAN - METROPOLITAN AREA CLINIC 70 | Facility: CLINIC | Age: 68
End: 2023-01-03

## 2023-01-19 PROBLEM — 166643006 LIVER ENZYMES ABNORMAL: Status: ACTIVE | Noted: 2022-05-24

## 2023-01-19 PROBLEM — 8493009: Status: ACTIVE | Noted: 2022-06-13

## 2023-01-19 PROBLEM — 1761006: Status: ACTIVE | Noted: 2021-07-01

## 2023-01-19 PROBLEM — 19943007: Status: ACTIVE | Noted: 2021-08-04

## 2023-01-19 PROBLEM — 301715003: Status: ACTIVE | Noted: 2022-12-21

## 2023-02-09 ENCOUNTER — TELEPHONE ENCOUNTER (OUTPATIENT)
Dept: URBAN - METROPOLITAN AREA CLINIC 70 | Facility: CLINIC | Age: 68
End: 2023-02-09

## 2023-02-09 RX ORDER — URSODIOL 300 MG/1
TAKE 1 CAPSULE TWICE A DAY WITH FOOD CAPSULE ORAL TWICE A DAY
Qty: 60 | Refills: 0

## 2023-04-14 ENCOUNTER — OFFICE VISIT (OUTPATIENT)
Dept: URBAN - METROPOLITAN AREA CLINIC 70 | Facility: CLINIC | Age: 68
End: 2023-04-14

## 2023-04-24 ENCOUNTER — OFFICE VISIT (OUTPATIENT)
Dept: URBAN - METROPOLITAN AREA CLINIC 70 | Facility: CLINIC | Age: 68
End: 2023-04-24

## 2023-04-24 VITALS
WEIGHT: 162.8 LBS | OXYGEN SATURATION: 95 % | HEIGHT: 67 IN | DIASTOLIC BLOOD PRESSURE: 62 MMHG | BODY MASS INDEX: 25.55 KG/M2 | SYSTOLIC BLOOD PRESSURE: 93 MMHG | HEART RATE: 82 BPM

## 2023-04-24 RX ORDER — LISINOPRIL 40 MG/1
1 TABLET TABLET ORAL ONCE A DAY
Status: ACTIVE | COMMUNITY

## 2023-04-24 RX ORDER — URSODIOL 300 MG/1
TAKE 1 CAPSULE TWICE A DAY WITH FOOD CAPSULE ORAL TWICE A DAY
Qty: 60 | Refills: 0 | Status: ACTIVE | COMMUNITY

## 2023-04-24 RX ORDER — ALPRAZOLAM 0.5 MG/1
1 TABLET TABLET ORAL
Status: ACTIVE | COMMUNITY

## 2023-04-24 RX ORDER — OXYCODONE HYDROCHLORIDE 5 MG/1
1 TABLET AS NEEDED TABLET ORAL
Status: ACTIVE | COMMUNITY

## 2023-04-24 RX ORDER — OXCARBAZEPINE 150 MG/1
1 TABLET TABLET, FILM COATED ORAL TWICE A DAY
Status: ACTIVE | COMMUNITY

## 2023-04-24 RX ORDER — PANTOPRAZOLE SODIUM 40 MG/1
1 TABLET TABLET, DELAYED RELEASE ORAL ONCE A DAY
Qty: 90 TABLET | Refills: 3 | Status: ACTIVE | COMMUNITY
Start: 2022-06-13

## 2023-04-24 RX ORDER — FERROUS SULFATE 325(65) MG
1 TABLET TABLET ORAL ONCE A DAY
Status: ACTIVE | COMMUNITY

## 2023-04-24 RX ORDER — LEVOTHYROXINE SODIUM 125 UG/1
1 TABLET IN THE MORNING ON AN EMPTY STOMACH TABLET ORAL ONCE A DAY
Status: ACTIVE | COMMUNITY

## 2023-04-24 RX ORDER — AMLODIPINE BESYLATE 2.5 MG
1 TABLET TABLET ORAL ONCE A DAY
Status: ACTIVE | COMMUNITY

## 2023-04-24 RX ORDER — CLINDAMYCIN HYDROCHLORIDE 150 MG/1
3 CAPSULES CAPSULE ORAL
Status: ACTIVE | COMMUNITY

## 2023-04-24 RX ORDER — TRAMADOL HYDROCHLORIDE 50 MG/1
1 TABLET AS NEEDED TABLET, FILM COATED ORAL ONCE A DAY
Status: ACTIVE | COMMUNITY

## 2023-06-07 ENCOUNTER — LAB OUTSIDE AN ENCOUNTER (OUTPATIENT)
Dept: URBAN - METROPOLITAN AREA CLINIC 86 | Facility: CLINIC | Age: 68
End: 2023-06-07

## 2023-06-07 ENCOUNTER — OFFICE VISIT (OUTPATIENT)
Dept: URBAN - METROPOLITAN AREA CLINIC 86 | Facility: CLINIC | Age: 68
End: 2023-06-07
Payer: MEDICARE

## 2023-06-07 VITALS
WEIGHT: 156 LBS | HEIGHT: 67 IN | DIASTOLIC BLOOD PRESSURE: 73 MMHG | HEART RATE: 79 BPM | SYSTOLIC BLOOD PRESSURE: 131 MMHG | TEMPERATURE: 98.7 F | BODY MASS INDEX: 24.48 KG/M2

## 2023-06-07 DIAGNOSIS — K76.0 FATTY LIVER: ICD-10-CM

## 2023-06-07 DIAGNOSIS — K74.60 CIRRHOSIS OF LIVER WITHOUT ASCITES, UNSPECIFIED HEPATIC CIRRHOSIS TYPE: ICD-10-CM

## 2023-06-07 DIAGNOSIS — K74.02 HEPATIC FIBROSIS, ADVANCED FIBROSIS: ICD-10-CM

## 2023-06-07 PROCEDURE — 99215 OFFICE O/P EST HI 40 MIN: CPT

## 2023-06-07 RX ORDER — PAROXETINE HYDROCHLORIDE HEMIHYDRATE 20 MG/1
1 TABLET IN THE MORNING TABLET, FILM COATED ORAL ONCE A DAY
Status: ACTIVE | COMMUNITY

## 2023-06-07 RX ORDER — TRIAMCINOLONE ACETONIDE 0.25 MG/G
1 APPLICATION CREAM TOPICAL ONCE A DAY
Status: ACTIVE | COMMUNITY

## 2023-06-07 RX ORDER — LISINOPRIL 40 MG/1
1 TABLET TABLET ORAL ONCE A DAY
Status: ACTIVE | COMMUNITY

## 2023-06-07 RX ORDER — AMLODIPINE BESYLATE 5 MG/1
1 TABLET TABLET ORAL ONCE A DAY
Status: ACTIVE | COMMUNITY

## 2023-06-07 RX ORDER — TRAMADOL HYDROCHLORIDE 50 MG/1
1 TABLET AS NEEDED TABLET, FILM COATED ORAL ONCE A DAY
Status: ACTIVE | COMMUNITY

## 2023-06-07 RX ORDER — URSODIOL 300 MG/1
TAKE 1 CAPSULE TWICE A DAY WITH FOOD CAPSULE ORAL TWICE A DAY
Qty: 60 | Refills: 0 | Status: ON HOLD | COMMUNITY

## 2023-06-07 RX ORDER — PANTOPRAZOLE SODIUM 40 MG/1
1 TABLET TABLET, DELAYED RELEASE ORAL ONCE A DAY
Qty: 90 TABLET | Refills: 3 | Status: DISCONTINUED | COMMUNITY
Start: 2022-06-13

## 2023-06-07 RX ORDER — LEVOTHYROXINE SODIUM 125 UG/1
1 TABLET IN THE MORNING ON AN EMPTY STOMACH TABLET ORAL ONCE A DAY
Status: ACTIVE | COMMUNITY

## 2023-06-07 RX ORDER — CLINDAMYCIN HYDROCHLORIDE 150 MG/1
3 CAPSULES CAPSULE ORAL
Status: DISCONTINUED | COMMUNITY

## 2023-06-07 RX ORDER — ALPRAZOLAM 0.5 MG/1
1 TABLET TABLET ORAL
Status: ACTIVE | COMMUNITY

## 2023-06-07 RX ORDER — OXCARBAZEPINE 150 MG/1
1 TABLET TABLET, FILM COATED ORAL TWICE A DAY
Status: ACTIVE | COMMUNITY

## 2023-06-07 RX ORDER — OXYCODONE HYDROCHLORIDE 5 MG/1
1 TABLET AS NEEDED TABLET ORAL
Status: DISCONTINUED | COMMUNITY

## 2023-06-07 RX ORDER — CALCIUM CARBONATE/VITAMIN D3 600MG-5MCG
1 TABLET WITH A MEAL TABLET ORAL ONCE A DAY
Status: ACTIVE | COMMUNITY

## 2023-06-07 RX ORDER — LIDOCAINE, MENTHOL, METHYL SALICYLATE, CAMPHOR 4; 3; 9; 1.2 1/1; 1/1; 1/1; 1/1
AS DIRECTED PATCH TOPICAL
Status: ACTIVE | COMMUNITY

## 2023-06-07 RX ORDER — FERROUS SULFATE 325(65) MG
1 TABLET TABLET ORAL ONCE A DAY
Status: DISCONTINUED | COMMUNITY

## 2023-06-07 NOTE — HPI-TODAY'S VISIT:
Patient is a 67 yo female was referred by Dr. Esme Manning and last seen Aug 2022 for an evaluation of fatty liver and mri suggesting nodular liver.    A copy of this note will be sent to the referring provider.   Pt since had hip surgery in 2022 and she did labs at the [facility] and [facility]. She says hips ok and now right knee and back pain. Came in in wheelchair. Has bone spur issue in ankle.  No new labs.  [place].   Can get labs to see how doing.  April 2022 local labs sent to us. Glucose was 101 is slightly up uric acid 4.9 BUN is 16 creatinine 0.71 sodium 135 potassium 4.6 chloride 101 calcium 9.3 albumin slightly low at 3.3 bilirubin 0.4 alkaline phosphatase 234.   , .  Glycerides 117 cholesterol 184.  HDL low at 36.  LDL elevated at 127.  Thyroxine T4 was 13.8 elevated.  White cell count 5.0 hemoglobin low at 9.8 hematocrit normal 35.4 MCV 87.  Platelet count 381.  Neutrophils 4.5 lymphocytes 1.8 eosinophils up at 0.1.  She had surgery July 26 2022 of the hip was.   [facility] office she sees Kianna there re the anemia. Dr Miranda.   She ran out of her ursodiol.   April 14 2022 MRI  Some motion noted during exam but limited it somewhat. Lower thorax shows heart top normal.  Bibasilar atelectasis and scarring seen.  Please share with primary provider. Liver fat noted to be elevated at 9.79 up to 11.83%. Normal is 6%   Please see chronic liver disease changes including lobar redistribution and nodular contour.  No definite liver cancer signs.  Liver vessels patent.  They do see signs of fibrosis noted. Prior cholecystectomy changes seen. Spleen normal. Some prominent portacaval lymph nodes noted measuring 1.5 x 2.7 and another measuring 0.9 x 1.7 cm and nonspecific and may be related to your liver disease. Moderate atherosclerotic disease seen but without aortic aneurysm.  Please share with primary provider.  You do have a history of hyperlipidemia and so that obviously can add to the wrist to have atherosclerosis.  I does appear that you are also on treatment for same. She was on crestor and prior held 2m ago and not on it and she may want to relook at that. Degenerative changes seen of your spine.  She remains off her crestor.  She did her cataract surgery and that went well and uses glasses to read.  Clindamycin taking for post op.  April 24 Dr Miranda and they told her to see us.  She stopped cymbalta 2/2/22  and could only be off for 3 days and then had bad panic attacks and needed to restart it and then eventually changed to paxil.   Feb 2022 u/s shows: Liver appearing normal, no liver lesion noted.  Pancreas normal, spleen normal in size.  Patent hepatic vasculature.  Common bile duct measures 5.6 mm which is within normal limits for postcholecystectomy state  Pt was dx with covid on 1/25, had symptoms the week prior. stopped doxy on 1/26, she has one more day of prednisone dose.  went to the hospital and received infusion of MAB last week.  She had symptoms around the time she did our labs. Suspect bump from this and/or DILI from doxy and cymbalta. Since she's been off of doxy, recommend she avoid this and recommend she stop cymbalta as well. will also set her up to do an u/s with doppler now assessing vessel patency.  She has not done covid 19 vaccine.  Xarelto for blood thinner 10mg for 30 days.  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 Prior had been on doxy 100mg for this for 1 year and steroids and could have added to liver issues. Both can inc the lfts and trigger issues.  She never started the dupixent for this.  She was taken off of statin rx 2 months prior to visit here in 2022.    She had labs done by pcp in oct 2021-was told lfts were abnormal   Liver biopsy from August 2021 shows mild fatty change, minimal chronic hepatitis grade 1, moderate fibrosis, stage II.  Under the description it states there is moderate degree of periportal fibrosis with few bridging portal to portal fibrosis.  Minimal degree of lobular inflammation is noted with rare spotty necrosis.  Mild microvesicular fatty change less than 20% is noted.  Clinical laboratory and serologic findings is suggested. will have this path reviewed by dr. paul   Lost 190 to 163 april in aug 2022 at 163 and now 158 today in June 2023 and watching what she eats. .  Plan: 1.  We need to do liver labs to see how doing.  2.  See how labs are and decide on the ursodiol, 3.  Plan to get the u.s done near her. Wants to do at [facility]. 4.  She needs to do a telemed in 6 weeks to review this.   Duration of the visit was 45 minutes with 10 minutes of chart prep and 35  minutes for this face to face visit with time reviewing their prior and recent records and labs and discussing their current status and future plans for care for the patient.

## 2023-06-07 NOTE — EXAM-PHYSICAL EXAM
General: pleasant, well developed, well nourished, no acute distress, in wheelchair and examined in same. Eyes:  no scleral icterus and normal lids. HENT: normocephalic, atraumatic head. Mouth: dry lips. Nose: no drainage.  Neck: supple, no JVD Chest: normal breath sounds, no wheezes. Heart: regular rate and rhythm without murmur Abdomen: soft, non tender, non distended, bowel sounds present, no appreciable hsm Musculoskeletal: normal gait and station Extremities: no edema, no asterixis, some palmar erythema Skin: no rashes, no jaundice Neurologic: no focal deficits, alert and oriented x 3

## 2023-06-08 ENCOUNTER — TELEPHONE ENCOUNTER (OUTPATIENT)
Dept: URBAN - METROPOLITAN AREA CLINIC 86 | Facility: CLINIC | Age: 68
End: 2023-06-08

## 2023-06-08 LAB
A/G RATIO: 1.4
ABSOLUTE BASOPHILS: 72
ABSOLUTE EOSINOPHILS: 328
ABSOLUTE LYMPHOCYTES: 2096
ABSOLUTE MONOCYTES: 736
ABSOLUTE NEUTROPHILS: 4768
ALBUMIN: 4.3
ALKALINE PHOSPHATASE: 102
ALT (SGPT): 21
AST (SGOT): 30
BASOPHILS: 0.9
BILIRUBIN, TOTAL: 0.7
BUN/CREATININE RATIO: (no result)
BUN: 10
CALCIUM: 9.7
CARBON DIOXIDE, TOTAL: 24
CHLORIDE: 96
CREATININE: 0.74
EGFR: 88
EOSINOPHILS: 4.1
GLOBULIN, TOTAL: 3
GLUCOSE: 78
HEMATOCRIT: 27.1
HEMOGLOBIN: 7.9
INR: 1
LYMPHOCYTES: 26.2
MCH: 23.5
MCHC: 29.2
MCV: 80.7
MONOCYTES: 9.2
MPV: 10.5
NEUTROPHILS: 59.6
PLATELET COUNT: 387
POTASSIUM: 4.7
PROTEIN, TOTAL: 7.3
PT: 10.2
RDW: 15.8
RED BLOOD CELL COUNT: 3.36
SODIUM: 131
WHITE BLOOD CELL COUNT: 8

## 2023-06-08 NOTE — HPI-TODAY'S VISIT:
Dear Alice Grayson, June 7 and inr 1.0 and normal. Glu 78 and bun 10 and cr 0.74 and na 131 little low and prior 133. K 4.7 and cl 96. Co2 24. Ca 9.7 and alb 4.3 normal. Tb 0.7 and alk 102 and ast 30 and alt 21.  April 2022 tb 0.4 and alk 320 and 106 and alt 106.  Wbc 8.0 and hg 7.9 little low.  Hct 27.1 also low.  Platelets 387. Neutrophils 4768 and lymph 2096.  As you recall, you prior had lost from 190 to 163 to 158 pounds so that clearly helped the fatty liver. Also off the Cymbalta. Still anemic and follow up with primary re same. If start statin needs low dose and with close labs. Dr Magana

## 2023-06-20 ENCOUNTER — TELEPHONE ENCOUNTER (OUTPATIENT)
Dept: URBAN - METROPOLITAN AREA CLINIC 86 | Facility: CLINIC | Age: 68
End: 2023-06-20

## 2023-06-20 NOTE — HPI-TODAY'S VISIT:
Dear Alice Grayson, June 16 [facility] was sent in today. They mentioned that the aorta and IVC were obscured by bowel gas but that the pancreas appeared normal.   The spleen was 8 cm.   The right kidney and the left kidney were both 8 cm in length.   Cholecystectomy changes were seen.   A 5 mm common bile duct was noted which is normal.   The liver span was 15 cm but was somewhat heterogeneous.  No liver masses were seen. In summary they felt that your liver was heterogeneous. While they did not say this specifically, when they say that the liver is heterogeneous, this usually means that they suspect that the liver is fatty.  As you know you had loss from about 190 about 158 pounds and we were hoping that that would help the liver to look normal at this juncture but it has not resolved yet. Your liver biopsy that you did back in August 2021 showed mild fatty changes and stage II fibrosis.   Your August 2022 MRI showed no fat was still elevated then at 9.79 up to 11.83% with normal being 6% and they saw chronic liver disease changes including lobar redistribution and nodular contour. Given the somewhat limited information obtained on this ultrasound we can talk about doing an MRI instead at the 6-month interval next time and we can discuss and plan this with you at the next visit.  Dr. Magana Addendum: Dear Alice Grayson, June 21 _update.  They sent in the Doppler portion of your ultrasound which you did back on June 16 and it was an unremarkable exam.  They mention that you had normal waveform seen with normal flow in the aorta as well as in the inferior vena cava.  The portal vein had hepatofugal flow which is in the normal direction.  Normal flow was seen in the splenic vein.  I believe that they meant to say that she had normal flow in the hepatic vein (they called it [place] vein on report) but I believe there is a typo there with the transcription of that. These devices will sometimes hear something and attach a different similar word to the report instead. Dr. Magana

## 2023-09-07 ENCOUNTER — LAB OUTSIDE AN ENCOUNTER (OUTPATIENT)
Dept: URBAN - METROPOLITAN AREA CLINIC 86 | Facility: CLINIC | Age: 68
End: 2023-09-07

## 2023-09-12 ENCOUNTER — TELEPHONE ENCOUNTER (OUTPATIENT)
Dept: URBAN - METROPOLITAN AREA CLINIC 86 | Facility: CLINIC | Age: 68
End: 2023-09-12

## 2023-09-12 ENCOUNTER — OFFICE VISIT (OUTPATIENT)
Dept: URBAN - METROPOLITAN AREA TELEHEALTH 2 | Facility: TELEHEALTH | Age: 68
End: 2023-09-12

## 2023-09-12 RX ORDER — LIDOCAINE, MENTHOL, METHYL SALICYLATE, CAMPHOR 4; 3; 9; 1.2 1/1; 1/1; 1/1; 1/1
AS DIRECTED PATCH TOPICAL
Status: ACTIVE | COMMUNITY

## 2023-09-12 RX ORDER — LISINOPRIL 40 MG/1
1 TABLET TABLET ORAL ONCE A DAY
Status: ACTIVE | COMMUNITY

## 2023-09-12 RX ORDER — OXCARBAZEPINE 150 MG/1
1 TABLET TABLET, FILM COATED ORAL TWICE A DAY
Status: ACTIVE | COMMUNITY

## 2023-09-12 RX ORDER — CALCIUM CARBONATE/VITAMIN D3 600MG-5MCG
1 TABLET WITH A MEAL TABLET ORAL ONCE A DAY
Status: ACTIVE | COMMUNITY

## 2023-09-12 RX ORDER — LEVOTHYROXINE SODIUM 125 UG/1
1 TABLET IN THE MORNING ON AN EMPTY STOMACH TABLET ORAL ONCE A DAY
Status: ACTIVE | COMMUNITY

## 2023-09-12 RX ORDER — TRAMADOL HYDROCHLORIDE 50 MG/1
1 TABLET AS NEEDED TABLET, FILM COATED ORAL ONCE A DAY
Status: ACTIVE | COMMUNITY

## 2023-09-12 RX ORDER — AMLODIPINE BESYLATE 5 MG/1
1 TABLET TABLET ORAL ONCE A DAY
Status: ACTIVE | COMMUNITY

## 2023-09-12 RX ORDER — ALPRAZOLAM 0.5 MG/1
1 TABLET TABLET ORAL
Status: ACTIVE | COMMUNITY

## 2023-09-12 RX ORDER — PAROXETINE HYDROCHLORIDE HEMIHYDRATE 20 MG/1
1 TABLET IN THE MORNING TABLET, FILM COATED ORAL ONCE A DAY
Status: ACTIVE | COMMUNITY

## 2023-09-12 RX ORDER — URSODIOL 300 MG/1
TAKE 1 CAPSULE TWICE A DAY WITH FOOD CAPSULE ORAL TWICE A DAY
Qty: 60 | Refills: 0 | Status: ON HOLD | COMMUNITY

## 2023-09-12 RX ORDER — TRIAMCINOLONE ACETONIDE 0.25 MG/G
1 APPLICATION CREAM TOPICAL ONCE A DAY
Status: ACTIVE | COMMUNITY

## 2023-09-12 NOTE — HPI-TODAY'S VISIT:
Patient is a 67 yo female was referred by Dr. Esme Manning and last seen June 2023 for an evaluation of fatty liver and mri suggesting nodular liver.    A copy of this note will be sent to the referring provider.   Pt since had hip surgery in 2022 and she did labs at the [facility] and [facility], [place]. She says hips ok and now right knee and back pain. Came in in wheelchair. Has bone spur issue in ankle.  Dear Alice Grayson, June 16 .[facility]. They mentioned that the aorta and IVC were obscured by bowel gas but that the pancreas appeared normal.   The spleen was 8 cm.   The right kidney and the left kidney were both 8 cm in length.   Cholecystectomy changes were seen.   A 5 mm common bile duct was noted which is normal.   The liver span was 15 cm but was somewhat heterogeneous.  No liver masses were seen. In summary they felt that your liver was heterogeneous. While they did not say this specifically, when they say that the liver is heterogeneous, this usually means that they suspect that the liver is fatty.  As you know you had loss from about 190 about 158 pounds and we were hoping that that would help the liver to look normal at this juncture but it has not resolved yet. Your liver biopsy that you did back in August 2021 showed mild fatty changes and stage II fibrosis.   Your August 2022 MRI showed no fat was still elevated then at 9.79 up to 11.83% with normal being 6% and they saw chronic liver disease changes including lobar redistribution and nodular contour. Given the somewhat limited information obtained on this ultrasound we can talk about doing an MRI instead at the 6-month interval next time and we can discuss and plan this with you at the next visit.  Dr. Magana Addendum: Dear Alice Grayson, June 21 _update.  They sent in the Doppler portion of your ultrasound which you did back on June 16 and it was an unremarkable exam.  They mention that you had normal waveform seen with normal flow in the aorta as well as in the inferior vena cava.  The portal vein had hepatofugal flow which is in the normal direction.  Normal flow was seen in the splenic vein.  I believe that they meant to say that she had normal flow in the hepatic vein (they called it Paddock vein on report) but I believe there is a typo there with the transcription of that. These devices will sometimes hear something and attach a different similar word to the report instead. Dr. Magana Dear Alice Kenan, June 7 and inr 1.0 and normal. Glu 78 and bun 10 and cr 0.74 and na 131 little low and prior 133. K 4.7 and cl 96. Co2 24. Ca 9.7 and alb 4.3 normal. Tb 0.7 and alk 102 and ast 30 and alt 21.  April 2022 tb 0.4 and alk 320 and 106 and alt 106.  Wbc 8.0 and hg 7.9 little low.  Hct 27.1 also low.  Platelets 387. Neutrophils 4768 and lymph 2096.  As you recall, you prior had lost from 190 to 163 to 158 pounds so that clearly helped the fatty liver. Also off the Cymbalta. Still anemic and follow up with primary re same. If start statin needs low dose and with close labs. Dr Magana  Can get labs to see how doing.  April 2022 local labs sent to us. Glucose was 101 is slightly up uric acid 4.9 BUN is 16 creatinine 0.71 sodium 135 potassium 4.6 chloride 101 calcium 9.3 albumin slightly low at 3.3 bilirubin 0.4 alkaline phosphatase 234.   , .  Glycerides 117 cholesterol 184.  HDL low at 36.  LDL elevated at 127.  Thyroxine T4 was 13.8 elevated.  White cell count 5.0 hemoglobin low at 9.8 hematocrit normal 35.4 MCV 87.  Platelet count 381.  Neutrophils 4.5 lymphocytes 1.8 eosinophils up at 0.1.  She had surgery July 26 2022 of the hip was.   [place] office she sees Kianna there re the anemia. Dr Miranda.   She ran out of her ursodiol.   April 14 2022 MRI  Some motion noted during exam but limited it somewhat. Lower thorax shows heart top normal.  Bibasilar atelectasis and scarring seen.  Please share with primary provider. Liver fat noted to be elevated at 9.79 up to 11.83%. Normal is 6%   Please see chronic liver disease changes including lobar redistribution and nodular contour.  No definite liver cancer signs.  Liver vessels patent.  They do see signs of fibrosis noted. Prior cholecystectomy changes seen. Spleen normal. Some prominent portacaval lymph nodes noted measuring 1.5 x 2.7 and another measuring 0.9 x 1.7 cm and nonspecific and may be related to your liver disease. Moderate atherosclerotic disease seen but without aortic aneurysm.  Please share with primary provider.  You do have a history of hyperlipidemia and so that obviously can add to the wrist to have atherosclerosis.  I does appear that you are also on treatment for same. She was on crestor and prior held 2m ago and not on it and she may want to relook at that. Degenerative changes seen of your spine.  She remains off her crestor.  She did her cataract surgery and that went well and uses glasses to read.  Clindamycin taking for post op.  April 24 Dr Miranda and they told her to see us.  She stopped cymbalta 2/2/22  and could only be off for 3 days and then had bad panic attacks and needed to restart it and then eventually changed to paxil.   Feb 2022 u/s shows: Liver appearing normal, no liver lesion noted.  Pancreas normal, spleen normal in size.  Patent hepatic vasculature.  Common bile duct measures 5.6 mm which is within normal limits for postcholecystectomy state  Pt was dx with covid on 1/25, had symptoms the week prior. stopped doxy on 1/26, she has one more day of prednisone dose.  went to the hospital and received infusion of MAB last week.  She had symptoms around the time she did our labs. Suspect bump from this and/or DILI from doxy and cymbalta. Since she's been off of doxy, recommend she avoid this and recommend she stop cymbalta as well. will also set her up to do an u/s with doppler now assessing vessel patency.  She has not done covid 19 vaccine.  Xarelto for blood thinner 10mg for 30 days.  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 Prior had been on doxy 100mg for this for 1 year and steroids and could have added to liver issues. Both can inc the lfts and trigger issues.  She never started the dupixent for this.  She was taken off of statin rx 2 months prior to visit here in 2022.    She had labs done by pcp in oct 2021-was told lfts were abnormal   Liver biopsy from August 2021 shows mild fatty change, minimal chronic hepatitis grade 1, moderate fibrosis, stage II.  Under the description it states there is moderate degree of periportal fibrosis with few bridging portal to portal fibrosis.  Minimal degree of lobular inflammation is noted with rare spotty necrosis.  Mild microvesicular fatty change less than 20% is noted.  Clinical laboratory and serologic findings is suggested. will have this path reviewed by dr. paul   Lost 190 to 163 april in aug 2022 at 163 and now 158 today in June 2023 and watching what she eats. .  Plan: 1.  We need to do liver labs to see how doing.  2.  See how labs are and decide on the ursodiol, 3.  Plan to get the u.s done near her. Wants to do at [facility] [employer]. 4.  She needs to do a telemed in 6 weeks to review this.   Duration of the visit was  minutes with 10 minutes of chart prep and 35  minutes for this face to face visit with time reviewing their prior and recent records and labs and discussing their current status and future plans for care for the patient.

## 2023-09-13 ENCOUNTER — TELEPHONE ENCOUNTER (OUTPATIENT)
Dept: URBAN - METROPOLITAN AREA CLINIC 86 | Facility: CLINIC | Age: 68
End: 2023-09-13

## 2023-11-09 ENCOUNTER — LAB OUTSIDE AN ENCOUNTER (OUTPATIENT)
Dept: URBAN - METROPOLITAN AREA TELEHEALTH 2 | Facility: TELEHEALTH | Age: 68
End: 2023-11-09

## 2023-11-09 ENCOUNTER — OFFICE VISIT (OUTPATIENT)
Dept: URBAN - METROPOLITAN AREA TELEHEALTH 2 | Facility: TELEHEALTH | Age: 68
End: 2023-11-09
Payer: MEDICARE

## 2023-11-09 VITALS — BODY MASS INDEX: 26.37 KG/M2 | WEIGHT: 168 LBS | HEIGHT: 67 IN

## 2023-11-09 DIAGNOSIS — K74.60 CIRRHOSIS OF LIVER WITHOUT ASCITES, UNSPECIFIED HEPATIC CIRRHOSIS TYPE: ICD-10-CM

## 2023-11-09 DIAGNOSIS — K76.0 FATTY LIVER: ICD-10-CM

## 2023-11-09 DIAGNOSIS — I10 HTN (HYPERTENSION): ICD-10-CM

## 2023-11-09 DIAGNOSIS — Z79.899 HIGH RISK MEDICATION USE: ICD-10-CM

## 2023-11-09 PROCEDURE — 99214 OFFICE O/P EST MOD 30 MIN: CPT

## 2023-11-09 RX ORDER — CALCIUM CARBONATE/VITAMIN D3 600MG-5MCG
1 TABLET WITH A MEAL TABLET ORAL ONCE A DAY
Status: ACTIVE | COMMUNITY

## 2023-11-09 RX ORDER — AMLODIPINE BESYLATE 5 MG/1
1 TABLET TABLET ORAL ONCE A DAY
Status: ACTIVE | COMMUNITY

## 2023-11-09 RX ORDER — PAROXETINE HYDROCHLORIDE HEMIHYDRATE 20 MG/1
1 TABLET IN THE MORNING TABLET, FILM COATED ORAL ONCE A DAY
Status: ACTIVE | COMMUNITY

## 2023-11-09 RX ORDER — VITAMIN B COMPLEX
AS DIRECTED CAPSULE ORAL
Status: ACTIVE | COMMUNITY

## 2023-11-09 RX ORDER — TRAMADOL HYDROCHLORIDE 50 MG/1
1 TABLET AS NEEDED TABLET, FILM COATED ORAL ONCE A DAY
Status: ACTIVE | COMMUNITY

## 2023-11-09 RX ORDER — LEVOTHYROXINE SODIUM 125 UG/1
1 TABLET IN THE MORNING ON AN EMPTY STOMACH TABLET ORAL ONCE A DAY
Status: ACTIVE | COMMUNITY

## 2023-11-09 RX ORDER — TRIAMCINOLONE ACETONIDE 0.25 MG/G
1 APPLICATION CREAM TOPICAL ONCE A DAY
Status: ACTIVE | COMMUNITY

## 2023-11-09 RX ORDER — OXCARBAZEPINE 150 MG/1
1 TABLET TABLET, FILM COATED ORAL TWICE A DAY
Status: ACTIVE | COMMUNITY

## 2023-11-09 RX ORDER — LIDOCAINE, MENTHOL, METHYL SALICYLATE, CAMPHOR 4; 3; 9; 1.2 1/1; 1/1; 1/1; 1/1
AS DIRECTED PATCH TOPICAL
Status: ACTIVE | COMMUNITY

## 2023-11-09 RX ORDER — LISINOPRIL 40 MG/1
1 TABLET TABLET ORAL ONCE A DAY
Status: ACTIVE | COMMUNITY

## 2023-11-09 RX ORDER — ALPRAZOLAM 0.5 MG/1
1 TABLET TABLET ORAL
Status: ACTIVE | COMMUNITY

## 2023-11-09 RX ORDER — URSODIOL 300 MG/1
TAKE 1 CAPSULE TWICE A DAY WITH FOOD CAPSULE ORAL TWICE A DAY
Qty: 60 | Refills: 0 | Status: ON HOLD | COMMUNITY

## 2023-11-09 NOTE — HPI-TODAY'S VISIT:
Patient is a 69 yo female was referred by Dr. Esme Manning and last seen June 2023 for an evaluation of fatty liver and mri suggesting nodular liver.    A copy of this note will be sent to the referring provider.   Oct 14 2023 glu 82 and ua 4.8 and bun 17 and cr 0.66 and na  137 and k 4.4 and cl 102 and ca 9.0 and alb 3.9 and tv 0.3 and alk 198 and ast 141 and alt 125 but down july 10 alk 341 and ast 246 and alt 225 and ggt 161. chol 189 and trg 115 and hdl 50 and ldl 118.  She has been eating less bread and not much fried. Lost weight 198 Jan to 138.6. ebc 5.3 and hg 12.6 and plat 194 and mcv 91 and neutrophils 2.8 and lyms 1.6.  b12 582 and folate 5.8 and vit d 26.7.  SHe is on iron but no herbals. No green tea or turmeric and no zinc or elderberry.  She says not finished 7 days bactrim for ears and the lasb were oct.  Pt since had hip surgery in 2022 and she did labs at the [facility]. She says hips ok and now right knee and back pain. Came in in wheelchair. Has bone spur issue in ankle. 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  She says has a nerve issue and seeing neurologist and may have polyneuropathy.  June 16 [facility] was sent in  They mentioned that the aorta and IVC were obscured by bowel gas but that the pancreas appeared normal.   The spleen was 8 cm.   The right kidney and the left kidney were both 8 cm in length.   Cholecystectomy changes were seen.   A 5 mm common bile duct was noted which is normal.   The liver span was 15 cm but was somewhat heterogeneous.  No liver masses were seen. In summary they felt that your liver was heterogeneous. While they did not say this specifically, when they say that the liver is heterogeneous, this usually means that they suspect that the liver is fatty.  As you know you had loss from about 190 about 158 pounds and we were hoping that that would help the liver to look normal at this juncture but it has not resolved yet.  Your liver biopsy that you did back in August 2021 showed mild fatty changes and stage II fibrosis.    Your August 2022 MRI showed no fat was still elevated then at 9.79 up to 11.83% with normal being 6% and they saw chronic liver disease changes including lobar redistribution and nodular contour.  June 21 _update.  They sent in the Doppler portion of your ultrasound which you did back on June 16 and it was an unremarkable exam.  They mention that you had normal waveform seen with normal flow in the aorta as well as in the inferior vena cava.  The portal vein had hepatofugal flow which is in the normal direction.  Normal flow was seen in the splenic vein.  I believe that they meant to say that she had normal flow in the hepatic vein (they called it Paddock vein on report) but I believe there is a typo there with the transcription of that. These devices will sometimes hear something and attach a different similar word to the report instead. Dr. Magana Dear Alice Grayson, June 7 and inr 1.0 and normal. Glu 78 and bun 10 and cr 0.74 and na 131 little low and prior 133. K 4.7 and cl 96. Co2 24. Ca 9.7 and alb 4.3 normal. Tb 0.7 and alk 102 and ast 30 and alt 21.  April 2022 tb 0.4 and alk 320 and 106 and alt 106.  Wbc 8.0 and hg 7.9 little low.  Hct 27.1 also low.  Platelets 387. Neutrophils 4768 and lymph 2096.  As you recall, you prior had lost from 190 to 163 to 158 pounds so that clearly helped the fatty liver. Also off the Cymbalta. Still anemic and follow up with primary re same. If start statin needs low dose and with close labs.  April 2022 local labs sent to us. Glucose was 101 is slightly up uric acid 4.9 BUN is 16 creatinine 0.71 sodium 135 potassium 4.6 chloride 101 calcium 9.3 albumin slightly low at 3.3 bilirubin 0.4 alkaline phosphatase 234.   , .  Glycerides 117 cholesterol 184.  HDL low at 36.  LDL elevated at 127.  Thyroxine T4 was 13.8 elevated.  White cell count 5.0 hemoglobin low at 9.8 hematocrit normal 35.4 MCV 87.  Platelet count 381.  Neutrophils 4.5 lymphocytes 1.8 eosinophils up at 0.1.  She had surgery July 26 2022 of the hip was.   [place] office she sees Kianna there re the anemia. Dr Miranda.   She ran out of her ursodiol and remains off it.  April 14 2022 MRI  Some motion noted during exam but limited it somewhat. Lower thorax shows heart top normal.  Bibasilar atelectasis and scarring seen.  Please share with primary provider. Liver fat noted to be elevated at 9.79 up to 11.83%. Normal is 6%   Please see chronic liver disease changes including lobar redistribution and nodular contour.  No definite liver cancer signs.  Liver vessels patent.  They do see signs of fibrosis noted. Prior cholecystectomy changes seen. Spleen normal. Some prominent portacaval lymph nodes noted measuring 1.5 x 2.7 and another measuring 0.9 x 1.7 cm and nonspecific and may be related to your liver disease. Moderate atherosclerotic disease seen but without aortic aneurysm.  Please share with primary provider.  You do have a history of hyperlipidemia and so that obviously can add to the wrist to have atherosclerosis.  I does appear that you are also on treatment for same. She was on crestor and prior held 2m ago and not on it and she may want to relook at that. Degenerative changes seen of your spine.  She remains off her crestor.  She did her cataract surgery and that went well and uses glasses to read.  Clindamycin taking for post op.  April 24 Dr Miranda and they told her to see us.  She stopped cymbalta 2/2/22  and could only be off for 3 days and then had bad panic attacks and needed to restart it and then eventually changed to paxil.   Feb 2022 u/s shows: Liver appearing normal, no liver lesion noted.  Pancreas normal, spleen normal in size.  Patent hepatic vasculature.  Common bile duct measures 5.6 mm which is within normal limits for postcholecystectomy state  Pt was dx with covid on 1/25, had symptoms the week prior. stopped doxy on 1/26, she has one more day of prednisone dose.  went to the hospital and received infusion of MAB last week.  She had symptoms around the time she did our labs. Suspect bump from this and/or DILI from doxy and cymbalta. Since she's been off of doxy, recommend she avoid this and recommend she stop cymbalta as well. will also set her up to do an u/s with doppler now assessing vessel patency.  She has not done covid 19 vaccine.  Xarelto for blood thinner 10mg for 30 days.  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 Prior had been on doxy 100mg for this for 1 year and steroids and could have added to liver issues. Both can inc the lfts and trigger issues.  She never started the dupixent for this.  She was taken off of statin rx 2 months prior to visit here in 2022.    She had labs done by pcp in oct 2021-was told lfts were abnormal   Liver biopsy from August 2021 shows mild fatty change, minimal chronic hepatitis grade 1, moderate fibrosis, stage II.  Under the description it states there is moderate degree of periportal fibrosis with few bridging portal to portal fibrosis.  Minimal degree of lobular inflammation is noted with rare spotty necrosis.  Mild microvesicular fatty change less than 20% is noted.  Clinical laboratory and serologic findings is suggested. will have this path reviewed by dr. paul   Lost 190 to 163 april in aug 2022 at 163 and now 158 today in June 2023 and watching what she eats. .  Plan: 1.  We do the u.s in dec. 2. We do the labs in dec. 3. Johnana or I see in Jan to see what the labs are doing. 4. Need to be doing better on u.s and labs with her weigh tloss.   Duration of the visit was 30 minutes with 10 minutes of chart prep and 20 minutes for this healow telemed visit with time reviewing their prior and recent records and labs and discussing their current status and future plans for care for the patient.

## 2023-11-09 NOTE — EXAM-PHYSICAL EXAM
Telemed self reported:  Gen: no distress. Eyes: no jaundice. Mouth: no thrush. CV: no chest pain. Resp: no wheezes. Abd: no pain. Ext: no edema.	 Neuro: no weakness. Musculo: more mobile

## 2023-11-22 ENCOUNTER — TELEPHONE ENCOUNTER (OUTPATIENT)
Dept: URBAN - METROPOLITAN AREA CLINIC 86 | Facility: CLINIC | Age: 68
End: 2023-11-22

## 2023-11-22 LAB
A/G RATIO: 0.9
ABSOLUTE BASOPHILS: 58
ABSOLUTE EOSINOPHILS: 250
ABSOLUTE LYMPHOCYTES: 1344
ABSOLUTE MONOCYTES: 466
ABSOLUTE NEUTROPHILS: 2683
ALBUMIN: 3.7
ALKALINE PHOSPHATASE: 111
ALT (SGPT): 76
AST (SGOT): 76
BASOPHILS: 1.2
BILIRUBIN, TOTAL: 0.4
BUN/CREATININE RATIO: (no result)
BUN: 16
CALCIUM: 9.5
CARBON DIOXIDE, TOTAL: 27
CHLORIDE: 103
CREATININE: 0.61
EGFR: 97
EOSINOPHILS: 5.2
GLOBULIN, TOTAL: 3.9
GLUCOSE: 90
HEMATOCRIT: 40.7
HEMOGLOBIN: 13.2
LYMPHOCYTES: 28
MCH: 30.3
MCHC: 32.4
MCV: 93.3
MONOCYTES: 9.7
MPV: 11.4
NEUTROPHILS: 55.9
PLATELET COUNT: 204
POTASSIUM: 4.2
PROTEIN, TOTAL: 7.6
RDW: 14.6
RED BLOOD CELL COUNT: 4.36
SODIUM: 139
WHITE BLOOD CELL COUNT: 4.8

## 2023-11-22 NOTE — HPI-TODAY'S VISIT:
Dear Alice Grayson,  November 21 labs show WBC 4.8, hemoglobin 13.2 and that is much better than back in June when your hemoglobin was low at 7.9.  Platelet count normal at 204. MCV normal at 93.3 and up from 80.7 back in June.  Clearly the hemoglobin level is better. Neutrophils and lymphocytes normal. Glucose was 90 BUN is 16 creatinine 0.61 sodium 139 potassium 4.2 calcium 9.5 albumin 3.7. Your globulin is a little bit up at 3.9 this is nonspecific but could reflect some recent immunity stimulation. Bilirubin normal at 0.4 and down from 0.41 in June. Alk phos normal but slightly higher at 111. AST 76 and ALT 76 and up from 30 and 21 back in June.  I found the more recent labs (that were even higher) which you did in October 14 for comparison and on those labs your alk phos of been 198 AST had been 246 and  so clearly these labs while higher than the older listed labs are definitely lower than these current labs. You mentioned that you have been taking iron but you denied any herbals.  You did also mention that on the previous labs (that were up) you had completed a 7-day course of Bactrim which may have explain why the labs were higher and that they came down for these labs. I still would like to get you  to your June labs which were normal other than an AST of 30 and ALT of 21 again if possible. I would recommend that you repeat your labs for us again in a month to see if the liver labs have improved. Dr. Magana

## 2023-12-01 ENCOUNTER — TELEPHONE ENCOUNTER (OUTPATIENT)
Dept: URBAN - METROPOLITAN AREA CLINIC 86 | Facility: CLINIC | Age: 68
End: 2023-12-01

## 2023-12-01 NOTE — HPI-TODAY'S VISIT:
Alice Grayson,  The recent ultrasound that you did at [facility] was sent to me.  The liver without any focal lesions.  They noted a mildly coarsened echotexture.  16 cm in length.  The gallbladder absent.  Common duct 7 mm.  Pancreas not well-seen due to bowel gas.  Spleen appears normal and measures 8.8 x 4.7 x 8.5 cm.  Right and left kidney appear normal.  Overall they noted that the liver was coarsened in appearance and and suspicious for cirrhosis.  If you recall previously the ultrasound was suggesting more of a fatty liver issue and then you had the labs that were up and now it is showing this picture.  We may want to consider doing a FibroScan to see if there is any scarring present and that would give us a little bit more detail.  Sometimes when the liver is inflamed it can appear worse.  We will review this at your follow-up. Johanna Galdamez PA-C

## 2023-12-20 ENCOUNTER — LAB OUTSIDE AN ENCOUNTER (OUTPATIENT)
Dept: URBAN - METROPOLITAN AREA CLINIC 86 | Facility: CLINIC | Age: 68
End: 2023-12-20

## 2023-12-21 ENCOUNTER — TELEPHONE ENCOUNTER (OUTPATIENT)
Dept: URBAN - METROPOLITAN AREA CLINIC 86 | Facility: CLINIC | Age: 68
End: 2023-12-21

## 2023-12-21 LAB
ALBUMIN/GLOBULIN RATIO: 1.1
ALBUMIN: 3.9
ALKALINE PHOSPHATASE: 80
ALT (SGPT): 36
AST (SGOT): 38
BILIRUBIN, DIRECT: 0.1
BILIRUBIN, INDIRECT: 0.5
BILIRUBIN, TOTAL: 0.6
GLOBULIN: 3.5
PROTEIN, TOTAL: 7.4

## 2023-12-21 NOTE — HPI-TODAY'S VISIT:
Dear Alice Grayson,   The 12/20/23 labs were sent to me.  The bilirubin total 0.6, alkaline phosphatase 80, AST 38, ALT was 36.  Previously back in October the AST was 141 and the ALT was 125 so these are much better and very happy to see this. We will review at the upcoming appointment.  Johanna Galdamez PA-C

## 2024-01-11 ENCOUNTER — TELEPHONE ENCOUNTER (OUTPATIENT)
Dept: URBAN - METROPOLITAN AREA CLINIC 86 | Facility: CLINIC | Age: 69
End: 2024-01-11

## 2024-01-11 ENCOUNTER — OFFICE VISIT (OUTPATIENT)
Dept: URBAN - METROPOLITAN AREA TELEHEALTH 2 | Facility: TELEHEALTH | Age: 69
End: 2024-01-11
Payer: MEDICARE

## 2024-01-11 DIAGNOSIS — K76.0 FATTY LIVER: ICD-10-CM

## 2024-01-11 DIAGNOSIS — Z79.899 HIGH RISK MEDICATION USE: ICD-10-CM

## 2024-01-11 DIAGNOSIS — R74.8 ABNORMAL LIVER ENZYMES: ICD-10-CM

## 2024-01-11 DIAGNOSIS — K74.69 OTHER CIRRHOSIS OF LIVER: ICD-10-CM

## 2024-01-11 PROCEDURE — 99214 OFFICE O/P EST MOD 30 MIN: CPT | Performed by: PHYSICIAN ASSISTANT

## 2024-01-11 RX ORDER — TRAMADOL HYDROCHLORIDE 50 MG/1
1 TABLET AS NEEDED TABLET, FILM COATED ORAL ONCE A DAY
Status: ACTIVE | COMMUNITY

## 2024-01-11 RX ORDER — LISINOPRIL 40 MG/1
1 TABLET TABLET ORAL ONCE A DAY
Status: ACTIVE | COMMUNITY

## 2024-01-11 RX ORDER — PAROXETINE HYDROCHLORIDE HEMIHYDRATE 20 MG/1
1 TABLET IN THE MORNING TABLET, FILM COATED ORAL ONCE A DAY
Status: ACTIVE | COMMUNITY

## 2024-01-11 RX ORDER — VITAMIN B COMPLEX
AS DIRECTED CAPSULE ORAL
Status: ACTIVE | COMMUNITY

## 2024-01-11 RX ORDER — LIDOCAINE, MENTHOL, METHYL SALICYLATE, CAMPHOR 4; 3; 9; 1.2 1/1; 1/1; 1/1; 1/1
AS DIRECTED PATCH TOPICAL
Status: ACTIVE | COMMUNITY

## 2024-01-11 RX ORDER — ALPRAZOLAM 0.5 MG/1
1 TABLET TABLET ORAL
Status: ACTIVE | COMMUNITY

## 2024-01-11 RX ORDER — OXCARBAZEPINE 150 MG/1
1 TABLET TABLET, FILM COATED ORAL TWICE A DAY
Status: ACTIVE | COMMUNITY

## 2024-01-11 RX ORDER — LEVOTHYROXINE SODIUM 125 UG/1
1 TABLET IN THE MORNING ON AN EMPTY STOMACH TABLET ORAL ONCE A DAY
Status: ACTIVE | COMMUNITY

## 2024-01-11 RX ORDER — CALCIUM CARBONATE/VITAMIN D3 600MG-5MCG
1 TABLET WITH A MEAL TABLET ORAL ONCE A DAY
Status: ACTIVE | COMMUNITY

## 2024-01-11 RX ORDER — TRIAMCINOLONE ACETONIDE 0.25 MG/G
1 APPLICATION CREAM TOPICAL ONCE A DAY
Status: ACTIVE | COMMUNITY

## 2024-01-11 RX ORDER — URSODIOL 300 MG/1
TAKE 1 CAPSULE TWICE A DAY WITH FOOD CAPSULE ORAL TWICE A DAY
Qty: 60 | Refills: 0 | Status: ON HOLD | COMMUNITY

## 2024-01-11 RX ORDER — AMLODIPINE BESYLATE 5 MG/1
1 TABLET TABLET ORAL ONCE A DAY
Status: ACTIVE | COMMUNITY

## 2024-01-11 NOTE — HPI-TODAY'S VISIT:
Patient is a 67 yo female was referred by Dr. Esme Manning and last seen June 2023 for an evaluation of fatty liver and mri suggesting nodular liver.    A copy of this note will be sent to the referring provider.   1/11/24  telemed, was supposed to be in office but patient not able to come in   The 12/20/23 labs were sent to me. The bilirubin total 0.6, alkaline phosphatase 80, AST 38, ALT was 36. Previously back in October the AST was 141 and the ALT was 125 so these are much better and very happy to see this. We will review at the upcoming appointment.   11/21/23 The recent ultrasound that you did at [facility] was sent to me. The liver without any focal lesions. They noted a mildly coarsened echotexture. 16 cm in length. The gallbladder absent. Common duct 7 mm. Pancreas not well-seen due to bowel gas. Spleen appears normal and measures 8.8 x 4.7 x 8.5 cm. Right and left kidney appear normal. Overall they noted that the liver was coarsened in appearance and and suspicious for cirrhosis. If you recall previously the ultrasound was suggesting more of a fatty liver issue and then you had the labs that were up and now it is showing this picture.   so suspect the labs improving still off the bactrim  she has appt on jan 24 with neurologist for her neuropathy  weight stable. she has stopped fried ofod and limit bread and reading the labels.  weight 163   recap November 21 labs show WBC 4.8, hemoglobin 13.2 and that is much better than back in June when your hemoglobin was low at 7.9. Platelet count normal at 204. MCV normal at 93.3 and up from 80.7 back in June. Clearly the hemoglobin level is better. Neutrophils and lymphocytes normal. Glucose was 90 BUN is 16 creatinine 0.61 sodium 139 potassium 4.2 calcium 9.5 albumin 3.7. Your globulin is a little bit up at 3.9 this is nonspecific but could reflect some recent immunity stimulation. Bilirubin normal at 0.4 and down from 0.41 in June. Alk phos normal but slightly higher at 111. AST 76 and ALT 76 and up from 30 and 21 back in June.  I found the more recent labs (that were even higher) which you did in October 14 for comparison and on those labs your alk phos of been 198 AST had been 246 and  so clearly these labs while higher than the older listed labs are definitely lower than these current labs. You mentioned that you have been taking iron but you denied any herbals.  You did also mention that on the previous labs (that were up) you had completed a 7-day course of Bactrim which may have explain why the labs were higher and that they came down for these labs. I still would like to get you  to your June labs which were normal other than an AST of 30 and ALT of 21 again if possible. I would recommend that you repeat your labs for us again in a month to see if the liver labs have improved. Dr. Magana   Oct 14 2023 glu 82 and ua 4.8 and bun 17 and cr 0.66 and na  137 and k 4.4 and cl 102 and ca 9.0 and alb 3.9 and tv 0.3 and alk 198 and ast 141 and alt 125 but down july 10 alk 341 and ast 246 and alt 225 and ggt 161. chol 189 and trg 115 and hdl 50 and ldl 118.  She has been eating less bread and not much fried. Lost weight 198 Jan to 138.6. ebc 5.3 and hg 12.6 and plat 194 and mcv 91 and neutrophils 2.8 and lyms 1.6.  b12 582 and folate 5.8 and vit d 26.7.  SHe is on iron but no herbals. No green tea or turmeric and no zinc or elderberry.  She says not finished 7 days bactrim for ears and the lasb were oct.  Pt since had hip surgery in 2022 and she did labs at the [facility] and [facility]. She says hips ok and now right knee and back pain. Came in in wheelchair. Has bone spur issue in ankle. 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  She says has a nerve issue and seeing neurologist and may have polyneuropathy.  June 16 .[facility] was sent in  They mentioned that the aorta and IVC were obscured by bowel gas but that the pancreas appeared normal.   The spleen was 8 cm.   The right kidney and the left kidney were both 8 cm in length.   Cholecystectomy changes were seen.   A 5 mm common bile duct was noted which is normal.   The liver span was 15 cm but was somewhat heterogeneous.  No liver masses were seen. In summary they felt that your liver was heterogeneous. While they did not say this specifically, when they say that the liver is heterogeneous, this usually means that they suspect that the liver is fatty.  As you know you had loss from about 190 about 158 pounds and we were hoping that that would help the liver to look normal at this juncture but it has not resolved yet.  Your liver biopsy that you did back in August 2021 showed mild fatty changes and stage II fibrosis.    Your August 2022 MRI showed no fat was still elevated then at 9.79 up to 11.83% with normal being 6% and they saw chronic liver disease changes including lobar redistribution and nodular contour.  June 21 _update.  They sent in the Doppler portion of your ultrasound which you did back on June 16 and it was an unremarkable exam.  They mention that you had normal waveform seen with normal flow in the aorta as well as in the inferior vena cava.  The portal vein had hepatofugal flow which is in the normal direction.  Normal flow was seen in the splenic vein.  I believe that they meant to say that she had normal flow in the hepatic vein (they called it Paddock vein on report) but I believe there is a typo there with the transcription of that. These devices will sometimes hear something and attach a different similar word to the report instead. Dr. Magana Dear Alice Grayson, June 7 and inr 1.0 and normal. Glu 78 and bun 10 and cr 0.74 and na 131 little low and prior 133. K 4.7 and cl 96. Co2 24. Ca 9.7 and alb 4.3 normal. Tb 0.7 and alk 102 and ast 30 and alt 21.  April 2022 tb 0.4 and alk 320 and 106 and alt 106.  Wbc 8.0 and hg 7.9 little low.  Hct 27.1 also low.  Platelets 387. Neutrophils 4768 and lymph 2096.  As you recall, you prior had lost from 190 to 163 to 158 pounds so that clearly helped the fatty liver. Also off the Cymbalta. Still anemic and follow up with primary re same. If start statin needs low dose and with close labs.  April 2022 local labs sent to us. Glucose was 101 is slightly up uric acid 4.9 BUN is 16 creatinine 0.71 sodium 135 potassium 4.6 chloride 101 calcium 9.3 albumin slightly low at 3.3 bilirubin 0.4 alkaline phosphatase 234.   , .  Glycerides 117 cholesterol 184.  HDL low at 36.  LDL elevated at 127.  Thyroxine T4 was 13.8 elevated.  White cell count 5.0 hemoglobin low at 9.8 hematocrit normal 35.4 MCV 87.  Platelet count 381.  Neutrophils 4.5 lymphocytes 1.8 eosinophils up at 0.1.  She had surgery July 26 2022 of the hip was.   [place] office she sees Kianna there re the anemia. Dr Miranda.   She ran out of her ursodiol and remains off it.  April 14 2022 MRI  Some motion noted during exam but limited it somewhat. Lower thorax shows heart top normal.  Bibasilar atelectasis and scarring seen.  Please share with primary provider. Liver fat noted to be elevated at 9.79 up to 11.83%. Normal is 6%   Please see chronic liver disease changes including lobar redistribution and nodular contour.  No definite liver cancer signs.  Liver vessels patent.  They do see signs of fibrosis noted. Prior cholecystectomy changes seen. Spleen normal. Some prominent portacaval lymph nodes noted measuring 1.5 x 2.7 and another measuring 0.9 x 1.7 cm and nonspecific and may be related to your liver disease. Moderate atherosclerotic disease seen but without aortic aneurysm.  Please share with primary provider.  You do have a history of hyperlipidemia and so that obviously can add to the wrist to have atherosclerosis.  I does appear that you are also on treatment for same. She was on crestor and prior held 2m ago and not on it and she may want to relook at that. Degenerative changes seen of your spine.  She remains off her crestor.  She did her cataract surgery and that went well and uses glasses to read.  Clindamycin taking for post op.  April 24 Dr Miranda and they told her to see us.  She stopped cymbalta 2/2/22  and could only be off for 3 days and then had bad panic attacks and needed to restart it and then eventually changed to paxil.   Feb 2022 u/s shows: Liver appearing normal, no liver lesion noted.  Pancreas normal, spleen normal in size.  Patent hepatic vasculature.  Common bile duct measures 5.6 mm which is within normal limits for postcholecystectomy state  Pt was dx with covid on 1/25, had symptoms the week prior. stopped doxy on 1/26, she has one more day of prednisone dose.  went to the hospital and received infusion of MAB last week.  She had symptoms around the time she did our labs. Suspect bump from this and/or DILI from doxy and cymbalta. Since she's been off of doxy, recommend she avoid this and recommend she stop cymbalta as well. will also set her up to do an u/s with doppler now assessing vessel patency.  She has not done covid 19 vaccine.  Xarelto for blood thinner 10mg for 30 days.  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 Prior had been on doxy 100mg for this for 1 year and steroids and could have added to liver issues. Both can inc the lfts and trigger issues.  She never started the dupixent for this.  She was taken off of statin rx 2 months prior to visit here in 2022.    She had labs done by pcp in oct 2021-was told lfts were abnormal   Liver biopsy from August 2021 shows mild fatty change, minimal chronic hepatitis grade 1, moderate fibrosis, stage II.  Under the description it states there is moderate degree of periportal fibrosis with few bridging portal to portal fibrosis.  Minimal degree of lobular inflammation is noted with rare spotty necrosis.  Mild microvesicular fatty change less than 20% is noted.  Clinical laboratory and serologic findings is suggested. will have this path reviewed by dr. paul   Lost 190 to 163 april in aug 2022 at 163 and now 158 today in June 2023 and watching what she eats. .

## 2024-03-04 ENCOUNTER — LAB (OUTPATIENT)
Dept: URBAN - METROPOLITAN AREA TELEHEALTH 2 | Facility: TELEHEALTH | Age: 69
End: 2024-03-04

## 2024-03-06 LAB
A/G RATIO: 1.1
ABSOLUTE BASOPHILS: 59
ABSOLUTE EOSINOPHILS: 208
ABSOLUTE LYMPHOCYTES: 2465
ABSOLUTE MONOCYTES: 525
ABSOLUTE NEUTROPHILS: 6643
ALBUMIN: 3.6
ALKALINE PHOSPHATASE: 64
ALT (SGPT): 15
AST (SGOT): 13
BASOPHILS: 0.6
BILIRUBIN, TOTAL: 0.4
BUN/CREATININE RATIO: (no result)
BUN: 15
CALCIUM: 8.8
CARBON DIOXIDE, TOTAL: 24
CHLORIDE: 101
CREATININE: 0.72
EGFR: 90
EOSINOPHILS: 2.1
GLOBULIN, TOTAL: 3.3
GLUCOSE: 126
HEMATOCRIT: 40.1
HEMOGLOBIN: 13.3
LYMPHOCYTES: 24.9
MCH: 32.1
MCHC: 33.2
MCV: 96.9
MONOCYTES: 5.3
MPV: 10.5
NEUTROPHILS: 67.1
PLATELET COUNT: 279
POTASSIUM: 4.2
PROTEIN, TOTAL: 6.9
RDW: 11.8
RED BLOOD CELL COUNT: 4.14
SODIUM: 137
WHITE BLOOD CELL COUNT: 9.9

## 2024-03-07 ENCOUNTER — TELEP (OUTPATIENT)
Dept: URBAN - METROPOLITAN AREA TELEHEALTH 2 | Facility: TELEHEALTH | Age: 69
End: 2024-03-07
Payer: MEDICARE

## 2024-03-07 ENCOUNTER — LAB (OUTPATIENT)
Dept: URBAN - METROPOLITAN AREA TELEHEALTH 2 | Facility: TELEHEALTH | Age: 69
End: 2024-03-07

## 2024-03-07 VITALS — HEIGHT: 67 IN | WEIGHT: 144 LBS | BODY MASS INDEX: 22.6 KG/M2

## 2024-03-07 DIAGNOSIS — K74.60 CIRRHOSIS OF LIVER WITHOUT ASCITES: ICD-10-CM

## 2024-03-07 DIAGNOSIS — K76.0 FATTY LIVER: ICD-10-CM

## 2024-03-07 DIAGNOSIS — K74.02 HEPATIC FIBROSIS, ADVANCED FIBROSIS: ICD-10-CM

## 2024-03-07 DIAGNOSIS — Z98.890 HISTORY OF LIVER BIOPSY: ICD-10-CM

## 2024-03-07 PROCEDURE — 99214 OFFICE O/P EST MOD 30 MIN: CPT | Performed by: PHYSICIAN ASSISTANT

## 2024-03-07 RX ORDER — LISINOPRIL 40 MG/1
1 TABLET TABLET ORAL ONCE A DAY
Status: ACTIVE | COMMUNITY

## 2024-03-07 RX ORDER — CALCIUM CARBONATE/VITAMIN D3 600MG-5MCG
1 TABLET WITH A MEAL TABLET ORAL ONCE A DAY
Status: ACTIVE | COMMUNITY

## 2024-03-07 RX ORDER — PAROXETINE HYDROCHLORIDE HEMIHYDRATE 20 MG/1
1 TABLET IN THE MORNING TABLET, FILM COATED ORAL ONCE A DAY
Status: ACTIVE | COMMUNITY

## 2024-03-07 RX ORDER — TRAMADOL HYDROCHLORIDE 50 MG/1
1 TABLET AS NEEDED TABLET, FILM COATED ORAL ONCE A DAY
Status: ACTIVE | COMMUNITY

## 2024-03-07 RX ORDER — VITAMIN B COMPLEX
AS DIRECTED CAPSULE ORAL
Status: ACTIVE | COMMUNITY

## 2024-03-07 RX ORDER — LIDOCAINE, MENTHOL, METHYL SALICYLATE, CAMPHOR 4; 3; 9; 1.2 1/1; 1/1; 1/1; 1/1
AS DIRECTED PATCH TOPICAL
Status: ACTIVE | COMMUNITY

## 2024-03-07 RX ORDER — TRIAMCINOLONE ACETONIDE 0.25 MG/G
1 APPLICATION CREAM TOPICAL ONCE A DAY
Status: ACTIVE | COMMUNITY

## 2024-03-07 RX ORDER — URSODIOL 300 MG/1
TAKE 1 CAPSULE TWICE A DAY WITH FOOD CAPSULE ORAL TWICE A DAY
Qty: 60 | Refills: 0 | Status: ON HOLD | COMMUNITY

## 2024-03-07 RX ORDER — AMLODIPINE BESYLATE 5 MG/1
1 TABLET TABLET ORAL ONCE A DAY
Status: ACTIVE | COMMUNITY

## 2024-03-07 RX ORDER — ALPRAZOLAM 0.5 MG/1
1 TABLET TABLET ORAL
Status: ACTIVE | COMMUNITY

## 2024-03-07 RX ORDER — GABAPENTIN 300 MG/1
1 CAPSULE CAPSULE ORAL ONCE A DAY
Status: ACTIVE | COMMUNITY

## 2024-03-07 RX ORDER — LEVOTHYROXINE SODIUM 125 UG/1
1 TABLET IN THE MORNING ON AN EMPTY STOMACH TABLET ORAL ONCE A DAY
Status: ACTIVE | COMMUNITY

## 2024-03-07 RX ORDER — OXCARBAZEPINE 150 MG/1
1 TABLET TABLET, FILM COATED ORAL TWICE A DAY
Status: ACTIVE | COMMUNITY

## 2024-03-07 NOTE — HPI-TODAY'S VISIT:
Patient is a 67 yo female was referred by Dr. Emse Manning and last seen June 2023 for an evaluation of fatty liver and mri suggesting nodular liver.    A copy of this note will be sent to the referring provider.    3/7/24 telemed tb 0.4, alp 64, ast 13, alt 15 , previously 38 and 36  cbc ok platelets normal at 276  had to monitor this off the abx    recap 1/11/24  telemed, was supposed to be in office but patient not able to come in   The 12/20/23 labs were sent to me. The bilirubin total 0.6, alkaline phosphatase 80, AST 38, ALT was 36. Previously back in October the AST was 141 and the ALT was 125 so these are much better and very happy to see this. We will review at the upcoming appointment.   11/21/23 The recent ultrasound that you did at [facility] was sent to me. The liver without any focal lesions. They noted a mildly coarsened echotexture. 16 cm in length. The gallbladder absent. Common duct 7 mm. Pancreas not well-seen due to bowel gas. Spleen appears normal and measures 8.8 x 4.7 x 8.5 cm. Right and left kidney appear normal. Overall they noted that the liver was coarsened in appearance and and suspicious for cirrhosis. If you recall previously the ultrasound was suggesting more of a fatty liver issue and then you had the labs that were up and now it is showing this picture.   so suspect the labs improving still off the bactrim  she has appt on jan 24 with neurologist for her neuropathy  weight stable. she has stopped fried ofod and limit bread and reading the labels.  weight 163   recap November 21 labs show WBC 4.8, hemoglobin 13.2 and that is much better than back in June when your hemoglobin was low at 7.9. Platelet count normal at 204. MCV normal at 93.3 and up from 80.7 back in June. Clearly the hemoglobin level is better. Neutrophils and lymphocytes normal. Glucose was 90 BUN is 16 creatinine 0.61 sodium 139 potassium 4.2 calcium 9.5 albumin 3.7. Your globulin is a little bit up at 3.9 this is nonspecific but could reflect some recent immunity stimulation. Bilirubin normal at 0.4 and down from 0.41 in June. Alk phos normal but slightly higher at 111. AST 76 and ALT 76 and up from 30 and 21 back in June.  I found the more recent labs (that were even higher) which you did in October 14 for comparison and on those labs your alk phos of been 198 AST had been 246 and  so clearly these labs while higher than the older listed labs are definitely lower than these current labs. You mentioned that you have been taking iron but you denied any herbals.  You did also mention that on the previous labs (that were up) you had completed a 7-day course of Bactrim which may have explain why the labs were higher and that they came down for these labs. I still would like to get you  to your June labs which were normal other than an AST of 30 and ALT of 21 again if possible. I would recommend that you repeat your labs for us again in a month to see if the liver labs have improved. Dr. Magana   Oct 14 2023 glu 82 and ua 4.8 and bun 17 and cr 0.66 and na  137 and k 4.4 and cl 102 and ca 9.0 and alb 3.9 and tv 0.3 and alk 198 and ast 141 and alt 125 but down july 10 alk 341 and ast 246 and alt 225 and ggt 161. chol 189 and trg 115 and hdl 50 and ldl 118.  She has been eating less bread and not much fried. Lost weight 198 Jan to 138.6. ebc 5.3 and hg 12.6 and plat 194 and mcv 91 and neutrophils 2.8 and lyms 1.6.  b12 582 and folate 5.8 and vit d 26.7.  SHe is on iron but no herbals. No green tea or turmeric and no zinc or elderberry.  She says not finished 7 days bactrim for ears and the lasb were oct.  Pt since had hip surgery in 2022 and she did labs at the [facility] and [facility]. She says hips ok and now right knee and back pain. Came in in wheelchair. Has bone spur issue in ankle. 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  She says has a nerve issue and seeing neurologist and may have polyneuropathy.  June 16 .[facility] was sent in  They mentioned that the aorta and IVC were obscured by bowel gas but that the pancreas appeared normal.   The spleen was 8 cm.   The right kidney and the left kidney were both 8 cm in length.   Cholecystectomy changes were seen.   A 5 mm common bile duct was noted which is normal.   The liver span was 15 cm but was somewhat heterogeneous.  No liver masses were seen. In summary they felt that your liver was heterogeneous. While they did not say this specifically, when they say that the liver is heterogeneous, this usually means that they suspect that the liver is fatty.  As you know you had loss from about 190 about 158 pounds and we were hoping that that would help the liver to look normal at this juncture but it has not resolved yet.  Your liver biopsy that you did back in August 2021 showed mild fatty changes and stage II fibrosis.    Your August 2022 MRI showed no fat was still elevated then at 9.79 up to 11.83% with normal being 6% and they saw chronic liver disease changes including lobar redistribution and nodular contour.  June 21 _update.  They sent in the Doppler portion of your ultrasound which you did back on June 16 and it was an unremarkable exam.  They mention that you had normal waveform seen with normal flow in the aorta as well as in the inferior vena cava.  The portal vein had hepatofugal flow which is in the normal direction.  Normal flow was seen in the splenic vein.  I believe that they meant to say that she had normal flow in the hepatic vein (they called it Paddock vein on report) but I believe there is a typo there with the transcription of that. These devices will sometimes hear something and attach a different similar word to the report instead. Dr. Magana Dear Alice Grayson, June 7 and inr 1.0 and normal. Glu 78 and bun 10 and cr 0.74 and na 131 little low and prior 133. K 4.7 and cl 96. Co2 24. Ca 9.7 and alb 4.3 normal. Tb 0.7 and alk 102 and ast 30 and alt 21.  April 2022 tb 0.4 and alk 320 and 106 and alt 106.  Wbc 8.0 and hg 7.9 little low.  Hct 27.1 also low.  Platelets 387. Neutrophils 4768 and lymph 2096.  As you recall, you prior had lost from 190 to 163 to 158 pounds so that clearly helped the fatty liver. Also off the Cymbalta. Still anemic and follow up with primary re same. If start statin needs low dose and with close labs.  April 2022 local labs sent to us. Glucose was 101 is slightly up uric acid 4.9 BUN is 16 creatinine 0.71 sodium 135 potassium 4.6 chloride 101 calcium 9.3 albumin slightly low at 3.3 bilirubin 0.4 alkaline phosphatase 234.   , .  Glycerides 117 cholesterol 184.  HDL low at 36.  LDL elevated at 127.  Thyroxine T4 was 13.8 elevated.  White cell count 5.0 hemoglobin low at 9.8 hematocrit normal 35.4 MCV 87.  Platelet count 381.  Neutrophils 4.5 lymphocytes 1.8 eosinophils up at 0.1.  She had surgery July 26 2022 of the hip was.   [place] office she sees Kianna there re the anemia. Dr Miranda.   She ran out of her ursodiol and remains off it.  April 14 2022 MRI  Some motion noted during exam but limited it somewhat. Lower thorax shows heart top normal.  Bibasilar atelectasis and scarring seen.  Please share with primary provider. Liver fat noted to be elevated at 9.79 up to 11.83%. Normal is 6%   Please see chronic liver disease changes including lobar redistribution and nodular contour.  No definite liver cancer signs.  Liver vessels patent.  They do see signs of fibrosis noted. Prior cholecystectomy changes seen. Spleen normal. Some prominent portacaval lymph nodes noted measuring 1.5 x 2.7 and another measuring 0.9 x 1.7 cm and nonspecific and may be related to your liver disease. Moderate atherosclerotic disease seen but without aortic aneurysm.  Please share with primary provider.  You do have a history of hyperlipidemia and so that obviously can add to the wrist to have atherosclerosis.  I does appear that you are also on treatment for same. She was on crestor and prior held 2m ago and not on it and she may want to relook at that. Degenerative changes seen of your spine.  She remains off her crestor.  She did her cataract surgery and that went well and uses glasses to read.  Clindamycin taking for post op.  April 24 Dr Miranda and they told her to see us.  She stopped cymbalta 2/2/22  and could only be off for 3 days and then had bad panic attacks and needed to restart it and then eventually changed to paxil.   Feb 2022 u/s shows: Liver appearing normal, no liver lesion noted.  Pancreas normal, spleen normal in size.  Patent hepatic vasculature.  Common bile duct measures 5.6 mm which is within normal limits for postcholecystectomy state  Pt was dx with covid on 1/25, had symptoms the week prior. stopped doxy on 1/26, she has one more day of prednisone dose.  went to the hospital and received infusion of MAB last week.  She had symptoms around the time she did our labs. Suspect bump from this and/or DILI from doxy and cymbalta. Since she's been off of doxy, recommend she avoid this and recommend she stop cymbalta as well. will also set her up to do an u/s with doppler now assessing vessel patency.  She has not done covid 19 vaccine.  Xarelto for blood thinner 10mg for 30 days.  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 Prior had been on doxy 100mg for this for 1 year and steroids and could have added to liver issues. Both can inc the lfts and trigger issues.  She never started the dupixent for this.  She was taken off of statin rx 2 months prior to visit here in 2022.    She had labs done by pcp in oct 2021-was told lfts were abnormal   Liver biopsy from August 2021 shows mild fatty change, minimal chronic hepatitis grade 1, moderate fibrosis, stage II.  Under the description it states there is moderate degree of periportal fibrosis with few bridging portal to portal fibrosis.  Minimal degree of lobular inflammation is noted with rare spotty necrosis.  Mild microvesicular fatty change less than 20% is noted.  Clinical laboratory and serologic findings is suggested. will have this path reviewed by dr. paul   Lost 190 to 163 april in aug 2022 at 163 and now 158 today in June 2023 and watching what she eats. .

## 2024-05-20 ENCOUNTER — LAB OUTSIDE AN ENCOUNTER (OUTPATIENT)
Dept: URBAN - METROPOLITAN AREA TELEHEALTH 2 | Facility: TELEHEALTH | Age: 69
End: 2024-05-20

## 2024-05-31 ENCOUNTER — TELEPHONE ENCOUNTER (OUTPATIENT)
Dept: URBAN - METROPOLITAN AREA CLINIC 86 | Facility: CLINIC | Age: 69
End: 2024-05-31

## 2024-07-31 ENCOUNTER — OFFICE VISIT (OUTPATIENT)
Dept: URBAN - METROPOLITAN AREA TELEHEALTH 2 | Facility: TELEHEALTH | Age: 69
End: 2024-07-31

## 2024-07-31 VITALS — HEIGHT: 67 IN | BODY MASS INDEX: 22.76 KG/M2 | WEIGHT: 145 LBS

## 2024-09-24 ENCOUNTER — TELEPHONE ENCOUNTER (OUTPATIENT)
Dept: URBAN - METROPOLITAN AREA CLINIC 86 | Facility: CLINIC | Age: 69
End: 2024-09-24

## 2024-09-24 NOTE — HPI-TODAY'S VISIT:
Dear Alice Grayson, The recent labs are sent to me dated September 19.  The creatinine 0.66, sodium 135, potassium 4.5, bilirubin 0.7, alkaline phosphatase 76, AST 24, ALT 21.  Goal for the AST and ALT is less than 25.  Previously back in March the AST was 13 and the ALT was 15 so they are slightly higher now.  Any idea what changed?  The complete blood count was normal.  Platelets 207 and this is normal.  Will review at the visit and see how you are doing. Johanna Galdamez PA-C

## 2024-10-03 ENCOUNTER — DASHBOARD ENCOUNTERS (OUTPATIENT)
Age: 69
End: 2024-10-03

## 2024-10-04 ENCOUNTER — OFFICE VISIT (OUTPATIENT)
Dept: URBAN - METROPOLITAN AREA TELEHEALTH 2 | Facility: TELEHEALTH | Age: 69
End: 2024-10-04
Payer: COMMERCIAL

## 2024-10-04 VITALS — WEIGHT: 141.2 LBS | HEIGHT: 67 IN | BODY MASS INDEX: 22.16 KG/M2

## 2024-10-04 DIAGNOSIS — K74.60 CIRRHOSIS OF LIVER WITHOUT ASCITES, UNSPECIFIED HEPATIC CIRRHOSIS TYPE: ICD-10-CM

## 2024-10-04 DIAGNOSIS — Z71.85 VACCINE COUNSELING: ICD-10-CM

## 2024-10-04 DIAGNOSIS — Z71.89 VACCINE COUNSELING: ICD-10-CM

## 2024-10-04 DIAGNOSIS — R76.0 ABNORMAL ANTIBODY TITER: ICD-10-CM

## 2024-10-04 DIAGNOSIS — Z78.9 HEPATITIS B CORE ANTIBODY NEGATIVE: ICD-10-CM

## 2024-10-04 DIAGNOSIS — Z98.890 HISTORY OF LIVER BIOPSY: ICD-10-CM

## 2024-10-04 DIAGNOSIS — K76.0 FATTY LIVER: ICD-10-CM

## 2024-10-04 DIAGNOSIS — K74.02 HEPATIC FIBROSIS, ADVANCED FIBROSIS: ICD-10-CM

## 2024-10-04 DIAGNOSIS — E78.2 HYPERLIPIDEMIA, MIXED: ICD-10-CM

## 2024-10-04 DIAGNOSIS — I10 HTN (HYPERTENSION): ICD-10-CM

## 2024-10-04 DIAGNOSIS — Z79.899 HIGH RISK MEDICATION USE: ICD-10-CM

## 2024-10-04 DIAGNOSIS — Z96.649 HISTORY OF HIP REPLACEMENT: ICD-10-CM

## 2024-10-04 DIAGNOSIS — M25.559 HIP PAIN: ICD-10-CM

## 2024-10-04 DIAGNOSIS — I70.0 ATHEROSCLEROSIS OF AORTA: ICD-10-CM

## 2024-10-04 DIAGNOSIS — R74.8 ABNORMAL LIVER ENZYMES: ICD-10-CM

## 2024-10-04 DIAGNOSIS — D50.9 IRON DEFICIENCY ANEMIA: ICD-10-CM

## 2024-10-04 PROCEDURE — 99214 OFFICE O/P EST MOD 30 MIN: CPT | Performed by: PHYSICIAN ASSISTANT

## 2024-10-04 RX ORDER — URSODIOL 300 MG/1
TAKE 1 CAPSULE TWICE A DAY WITH FOOD CAPSULE ORAL TWICE A DAY
Qty: 60 | Refills: 0 | COMMUNITY

## 2024-10-04 RX ORDER — LEVOTHYROXINE SODIUM 125 UG/1
1 TABLET IN THE MORNING ON AN EMPTY STOMACH TABLET ORAL ONCE A DAY
Status: ACTIVE | COMMUNITY

## 2024-10-04 RX ORDER — VITAMIN B COMPLEX
AS DIRECTED CAPSULE ORAL
Status: ACTIVE | COMMUNITY

## 2024-10-04 RX ORDER — LISINOPRIL 40 MG/1
1 TABLET TABLET ORAL ONCE A DAY
Status: ACTIVE | COMMUNITY

## 2024-10-04 RX ORDER — ALPRAZOLAM 0.5 MG/1
1 TABLET TABLET ORAL
Status: ACTIVE | COMMUNITY

## 2024-10-04 RX ORDER — TRAMADOL HYDROCHLORIDE 50 MG/1
1 TABLET AS NEEDED TABLET, FILM COATED ORAL ONCE A DAY
Status: ACTIVE | COMMUNITY

## 2024-10-04 RX ORDER — PAROXETINE HYDROCHLORIDE HEMIHYDRATE 20 MG/1
1 TABLET IN THE MORNING TABLET, FILM COATED ORAL ONCE A DAY
Status: ACTIVE | COMMUNITY

## 2024-10-04 RX ORDER — TRIAMCINOLONE ACETONIDE 0.25 MG/G
1 APPLICATION CREAM TOPICAL ONCE A DAY
Status: ACTIVE | COMMUNITY

## 2024-10-04 RX ORDER — CALCIUM CARBONATE/VITAMIN D3 600MG-5MCG
1 TABLET WITH A MEAL TABLET ORAL ONCE A DAY
Status: ACTIVE | COMMUNITY

## 2024-10-04 RX ORDER — OXCARBAZEPINE 150 MG/1
1 TABLET TABLET, FILM COATED ORAL TWICE A DAY
Status: ACTIVE | COMMUNITY

## 2024-10-04 RX ORDER — LIDOCAINE, MENTHOL, METHYL SALICYLATE, CAMPHOR 4; 3; 9; 1.2 1/1; 1/1; 1/1; 1/1
AS DIRECTED PATCH TOPICAL
Status: ACTIVE | COMMUNITY

## 2024-10-04 RX ORDER — AMLODIPINE BESYLATE 5 MG/1
1 TABLET TABLET ORAL ONCE A DAY
Status: ACTIVE | COMMUNITY

## 2024-10-04 RX ORDER — GABAPENTIN 300 MG/1
1 CAPSULE CAPSULE ORAL ONCE A DAY
Status: ACTIVE | COMMUNITY

## 2024-10-04 NOTE — HPI-TODAY'S VISIT:
Patient is a 68 yo female was referred by Dr. Esme Manning and last seen March 2024 for an evaluation of fatty liver and mri suggesting nodular liver.    A copy of this note will be sent to the referring provider.   10/4/24 The recent labs are sent to me dated September 19. The creatinine 0.66, sodium 135, potassium 4.5, bilirubin 0.7, alkaline phosphatase 76, AST 24, ALT 21. Goal for the AST and ALT is less than 25. Previously back in March the AST was 13 and the ALT was 15 so they are slightly higher now. Any idea what changed? The complete blood count was normal. Platelets 207 and this is normal. Will review at the visit and see how you are doing.  she has bronchitis and ear infection  on abx and steriods and got steriod in office.     recap The 4/5/24 ultrasound from [facility] was sent to me. The Doppler showed that the hepatic vasculature was patent and this means that everything was getting good blood flow. The ultrasound showed the liver with coarsened echotexture but did not see any masses or biliary ductal dilatation. Gallbladder surgically absent. Common bile duct 5 mm this is normal. They thought the right and left kidney appeared normal. The spleen normal. The spleen is normal in size. Overall they noted a coarsened echotexture which can be concerning for chronic liver disease and if we were not sure about this we could get elastography to check on this. Previously had a biopsy in 2021 that showed stage II fibrosis and then you also had the MRI in 2022 and that was showing changes of chronic liver disease including lobar redistribution and nodular contour. At that time they saw fibrosis as well. Therefore this is not a new finding. I say we hold off on repeating the elastography for now and maybe that is something we can consider we do with the next imaging. We will review at the follow up. Be sure to continue to work on the fatty liver.  3/7/24 telemed  tb 0.4, alp 64, ast 13, alt 15 , previously 38 and 36  cbc ok platelets normal at 276  had to monitor this off the abx   1/11/24  telemed, was supposed to be in office but patient not able to come in   The 12/20/23 labs were sent to me. The bilirubin total 0.6, alkaline phosphatase 80, AST 38, ALT was 36. Previously back in October the AST was 141 and the ALT was 125 so these are much better and very happy to see this. We will review at the upcoming appointment.   11/21/23 The recent ultrasound that you did at [facility] was sent to me. The liver without any focal lesions. They noted a mildly coarsened echotexture. 16 cm in length. The gallbladder absent. Common duct 7 mm. Pancreas not well-seen due to bowel gas. Spleen appears normal and measures 8.8 x 4.7 x 8.5 cm. Right and left kidney appear normal. Overall they noted that the liver was coarsened in appearance and and suspicious for cirrhosis. If you recall previously the ultrasound was suggesting more of a fatty liver issue and then you had the labs that were up and now it is showing this picture.   so suspect the labs improving still off the bactrim  she has appt on jan 24 with neurologist for her neuropathy  weight stable. she has stopped fried ofod and limit bread and reading the labels.  weight 163   November 21 labs show WBC 4.8, hemoglobin 13.2 and that is much better than back in June when your hemoglobin was low at 7.9. Platelet count normal at 204. MCV normal at 93.3 and up from 80.7 back in June. Clearly the hemoglobin level is better. Neutrophils and lymphocytes normal. Glucose was 90 BUN is 16 creatinine 0.61 sodium 139 potassium 4.2 calcium 9.5 albumin 3.7. Your globulin is a little bit up at 3.9 this is nonspecific but could reflect some recent immunity stimulation. Bilirubin normal at 0.4 and down from 0.41 in June. Alk phos normal but slightly higher at 111. AST 76 and ALT 76 and up from 30 and 21 back in June.  I found the more recent labs (that were even higher) which you did in October 14 for comparison and on those labs your alk phos of been 198 AST had been 246 and  so clearly these labs while higher than the older listed labs are definitely lower than these current labs. You mentioned that you have been taking iron but you denied any herbals.  You did also mention that on the previous labs (that were up) you had completed a 7-day course of Bactrim which may have explain why the labs were higher and that they came down for these labs. I still would like to get you  to your June labs which were normal other than an AST of 30 and ALT of 21 again if possible. I would recommend that you repeat your labs for us again in a month to see if the liver labs have improved.   Oct 14 2023 glu 82 and ua 4.8 and bun 17 and cr 0.66 and na  137 and k 4.4 and cl 102 and ca 9.0 and alb 3.9 and tv 0.3 and alk 198 and ast 141 and alt 125 but down july 10 alk 341 and ast 246 and alt 225 and ggt 161. chol 189 and trg 115 and hdl 50 and ldl 118.  She has been eating less bread and not much fried. Lost weight 198 Jan to 138.6. ebc 5.3 and hg 12.6 and plat 194 and mcv 91 and neutrophils 2.8 and lyms 1.6.  b12 582 and folate 5.8 and vit d 26.7.  SHe is on iron but no herbals. No green tea or turmeric and no zinc or elderberry.  She says not finished 7 days bactrim for ears and the lasb were oct.  Pt since had hip surgery in 2022 and she did labs at the [facility] and [facility]. She says hips ok and now right knee and back pain. Came in in wheelchair. Has bone spur issue in ankle. 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  She says has a nerve issue and seeing neurologist and may have polyneuropathy.  June 16 .[facility] was sent in  They mentioned that the aorta and IVC were obscured by bowel gas but that the pancreas appeared normal.   The spleen was 8 cm.   The right kidney and the left kidney were both 8 cm in length.   Cholecystectomy changes were seen.   A 5 mm common bile duct was noted which is normal.   The liver span was 15 cm but was somewhat heterogeneous.  No liver masses were seen. In summary they felt that your liver was heterogeneous. While they did not say this specifically, when they say that the liver is heterogeneous, this usually means that they suspect that the liver is fatty.  As you know you had loss from about 190 about 158 pounds and we were hoping that that would help the liver to look normal at this juncture but it has not resolved yet.  Your liver biopsy that you did back in August 2021 showed mild fatty changes and stage II fibrosis.    Your August 2022 MRI showed no fat was still elevated then at 9.79 up to 11.83% with normal being 6% and they saw chronic liver disease changes including lobar redistribution and nodular contour.  June 21 _update.  They sent in the Doppler portion of your ultrasound which you did back on June 16 and it was an unremarkable exam.  They mention that you had normal waveform seen with normal flow in the aorta as well as in the inferior vena cava.  The portal vein had hepatofugal flow which is in the normal direction.  Normal flow was seen in the splenic vein.  I believe that they meant to say that she had normal flow in the hepatic vein (they called it Paddock vein on report) but I believe there is a typo there with the transcription of that. These devices will sometimes hear something and attach a different similar word to the report instead. Dr. Magana Dear Alice Grayson, June 7 and inr 1.0 and normal. Glu 78 and bun 10 and cr 0.74 and na 131 little low and prior 133. K 4.7 and cl 96. Co2 24. Ca 9.7 and alb 4.3 normal. Tb 0.7 and alk 102 and ast 30 and alt 21.  April 2022 tb 0.4 and alk 320 and 106 and alt 106.  Wbc 8.0 and hg 7.9 little low.  Hct 27.1 also low.  Platelets 387. Neutrophils 4768 and lymph 2096.  As you recall, you prior had lost from 190 to 163 to 158 pounds so that clearly helped the fatty liver. Also off the Cymbalta. Still anemic and follow up with primary re same. If start statin needs low dose and with close labs.  April 2022 local labs sent to us. Glucose was 101 is slightly up uric acid 4.9 BUN is 16 creatinine 0.71 sodium 135 potassium 4.6 chloride 101 calcium 9.3 albumin slightly low at 3.3 bilirubin 0.4 alkaline phosphatase 234.   , .  Glycerides 117 cholesterol 184.  HDL low at 36.  LDL elevated at 127.  Thyroxine T4 was 13.8 elevated.  White cell count 5.0 hemoglobin low at 9.8 hematocrit normal 35.4 MCV 87.  Platelet count 381.  Neutrophils 4.5 lymphocytes 1.8 eosinophils up at 0.1.  She had surgery July 26 2022 of the hip was.   [facility] she sees Kianna there re the anemia. Dr Miranda.   She ran out of her ursodiol and remains off it.  April 14 2022 MRI  Some motion noted during exam but limited it somewhat. Lower thorax shows heart top normal.  Bibasilar atelectasis and scarring seen.  Please share with primary provider. Liver fat noted to be elevated at 9.79 up to 11.83%. Normal is 6%   Please see chronic liver disease changes including lobar redistribution and nodular contour.  No definite liver cancer signs.  Liver vessels patent.  They do see signs of fibrosis noted. Prior cholecystectomy changes seen. Spleen normal. Some prominent portacaval lymph nodes noted measuring 1.5 x 2.7 and another measuring 0.9 x 1.7 cm and nonspecific and may be related to your liver disease. Moderate atherosclerotic disease seen but without aortic aneurysm.  Please share with primary provider.  You do have a history of hyperlipidemia and so that obviously can add to the wrist to have atherosclerosis.  I does appear that you are also on treatment for same. She was on crestor and prior held 2m ago and not on it and she may want to relook at that. Degenerative changes seen of your spine.  She remains off her crestor.  She did her cataract surgery and that went well and uses glasses to read.  Clindamycin taking for post op.  April 24 Dr Miranda and they told her to see us.  She stopped cymbalta 2/2/22  and could only be off for 3 days and then had bad panic attacks and needed to restart it and then eventually changed to paxil.   Feb 2022 u/s shows: Liver appearing normal, no liver lesion noted.  Pancreas normal, spleen normal in size.  Patent hepatic vasculature.  Common bile duct measures 5.6 mm which is within normal limits for postcholecystectomy state  Pt was dx with covid on 1/25, had symptoms the week prior. stopped doxy on 1/26, she has one more day of prednisone dose.  went to the hospital and received infusion of MAB last week.  She had symptoms around the time she did our labs. Suspect bump from this and/or DILI from doxy and cymbalta. Since she's been off of doxy, recommend she avoid this and recommend she stop cymbalta as well. will also set her up to do an u/s with doppler now assessing vessel patency.  She has not done covid 19 vaccine.  Xarelto for blood thinner 10mg for 30 days.  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 Prior had been on doxy 100mg for this for 1 year and steroids and could have added to liver issues. Both can inc the lfts and trigger issues.  She never started the dupixent for this.  She was taken off of statin rx 2 months prior to visit here in 2022.    She had labs done by pcp in oct 2021-was told lfts were abnormal   Liver biopsy from August 2021 shows mild fatty change, minimal chronic hepatitis grade 1, moderate fibrosis, stage II.  Under the description it states there is moderate degree of periportal fibrosis with few bridging portal to portal fibrosis.  Minimal degree of lobular inflammation is noted with rare spotty necrosis.  Mild microvesicular fatty change less than 20% is noted.  Clinical laboratory and serologic findings is suggested. will have this path reviewed by dr. paul   Lost 190 to 163 april in aug 2022 at 163 and now 158 today in June 2023 and watching what she eats. .

## 2025-03-11 ENCOUNTER — OFFICE VISIT (OUTPATIENT)
Dept: URBAN - METROPOLITAN AREA TELEHEALTH 2 | Facility: TELEHEALTH | Age: 70
End: 2025-03-11
Payer: COMMERCIAL

## 2025-03-11 ENCOUNTER — LAB OUTSIDE AN ENCOUNTER (OUTPATIENT)
Dept: URBAN - METROPOLITAN AREA TELEHEALTH 2 | Facility: TELEHEALTH | Age: 70
End: 2025-03-11

## 2025-03-11 VITALS — WEIGHT: 141 LBS | HEIGHT: 67 IN | BODY MASS INDEX: 22.13 KG/M2

## 2025-03-11 DIAGNOSIS — K74.02 HEPATIC FIBROSIS, ADVANCED FIBROSIS: ICD-10-CM

## 2025-03-11 DIAGNOSIS — Z79.899 HIGH RISK MEDICATION USE: ICD-10-CM

## 2025-03-11 DIAGNOSIS — E78.2 HYPERLIPIDEMIA, MIXED: ICD-10-CM

## 2025-03-11 DIAGNOSIS — K74.60 CIRRHOSIS OF LIVER WITHOUT ASCITES, UNSPECIFIED HEPATIC CIRRHOSIS TYPE: ICD-10-CM

## 2025-03-11 DIAGNOSIS — Z78.9 HEPATITIS B CORE ANTIBODY NEGATIVE: ICD-10-CM

## 2025-03-11 DIAGNOSIS — I10 HTN (HYPERTENSION): ICD-10-CM

## 2025-03-11 DIAGNOSIS — D50.9 IRON DEFICIENCY ANEMIA: ICD-10-CM

## 2025-03-11 DIAGNOSIS — Z71.85 VACCINE COUNSELING: ICD-10-CM

## 2025-03-11 DIAGNOSIS — K76.0 FATTY LIVER: ICD-10-CM

## 2025-03-11 DIAGNOSIS — R74.8 ABNORMAL LIVER ENZYMES: ICD-10-CM

## 2025-03-11 DIAGNOSIS — Z98.890 HISTORY OF LIVER BIOPSY: ICD-10-CM

## 2025-03-11 PROCEDURE — 99214 OFFICE O/P EST MOD 30 MIN: CPT | Performed by: PHYSICIAN ASSISTANT

## 2025-03-11 RX ORDER — PAROXETINE HYDROCHLORIDE HEMIHYDRATE 20 MG/1
1 TABLET IN THE MORNING TABLET, FILM COATED ORAL ONCE A DAY
Status: ACTIVE | COMMUNITY

## 2025-03-11 RX ORDER — CALCIUM CARBONATE/VITAMIN D3 600MG-5MCG
1 TABLET WITH A MEAL TABLET ORAL ONCE A DAY
Status: ACTIVE | COMMUNITY

## 2025-03-11 RX ORDER — VITAMIN B COMPLEX
AS DIRECTED CAPSULE ORAL
Status: ACTIVE | COMMUNITY

## 2025-03-11 RX ORDER — LISINOPRIL 40 MG/1
1 TABLET TABLET ORAL ONCE A DAY
Status: ACTIVE | COMMUNITY

## 2025-03-11 RX ORDER — TRAMADOL HYDROCHLORIDE 50 MG/1
1 TABLET AS NEEDED TABLET, FILM COATED ORAL ONCE A DAY
Status: ACTIVE | COMMUNITY

## 2025-03-11 RX ORDER — URSODIOL 300 MG/1
TAKE 1 CAPSULE TWICE A DAY WITH FOOD CAPSULE ORAL TWICE A DAY
Qty: 60 | Refills: 0 | COMMUNITY

## 2025-03-11 RX ORDER — ALPRAZOLAM 0.5 MG/1
1 TABLET TABLET ORAL
Status: ACTIVE | COMMUNITY

## 2025-03-11 RX ORDER — AMLODIPINE BESYLATE 5 MG/1
1 TABLET TABLET ORAL ONCE A DAY
Status: ACTIVE | COMMUNITY

## 2025-03-11 RX ORDER — GABAPENTIN 300 MG/1
1 CAPSULE CAPSULE ORAL ONCE A DAY
Status: ACTIVE | COMMUNITY

## 2025-03-11 RX ORDER — LEVOTHYROXINE SODIUM 125 UG/1
1 TABLET IN THE MORNING ON AN EMPTY STOMACH TABLET ORAL ONCE A DAY
Status: ACTIVE | COMMUNITY

## 2025-03-11 RX ORDER — TRIAMCINOLONE ACETONIDE 0.25 MG/G
1 APPLICATION CREAM TOPICAL ONCE A DAY
Status: ACTIVE | COMMUNITY

## 2025-03-11 RX ORDER — LIDOCAINE, MENTHOL, METHYL SALICYLATE, CAMPHOR 4; 3; 9; 1.2 1/1; 1/1; 1/1; 1/1
AS DIRECTED PATCH TOPICAL
Status: ACTIVE | COMMUNITY

## 2025-03-11 RX ORDER — OXCARBAZEPINE 150 MG/1
1 TABLET TABLET, FILM COATED ORAL TWICE A DAY
Status: ACTIVE | COMMUNITY

## 2025-03-11 NOTE — HPI-TODAY'S VISIT:
Patient is a 68 yo female was referred by Dr. Esme Manning and last seen March 2024 for an evaluation of fatty liver and mri suggesting nodular liver.    A copy of this note will be sent to the referring provider.   3/11/25 She just had the US done at [facility] she has not done the labs   denies any new meds denies swelling  recap 10/4/24 The recent labs are sent to me dated September 19. The creatinine 0.66, sodium 135, potassium 4.5, bilirubin 0.7, alkaline phosphatase 76, AST 24, ALT 21. Goal for the AST and ALT is less than 25. Previously back in March the AST was 13 and the ALT was 15 so they are slightly higher now. Any idea what changed? The complete blood count was normal. Platelets 207 and this is normal. Will review at the visit and see how you are doing.  she has bronchitis and ear infection  on abx and steriods and got steriod in office.    The 4/5/24 ultrasound from [facility] was sent to me. The Doppler showed that the hepatic vasculature was patent and this means that everything was getting good blood flow. The ultrasound showed the liver with coarsened echotexture but did not see any masses or biliary ductal dilatation. Gallbladder surgically absent. Common bile duct 5 mm this is normal. They thought the right and left kidney appeared normal. The spleen normal. The spleen is normal in size. Overall they noted a coarsened echotexture which can be concerning for chronic liver disease and if we were not sure about this we could get elastography to check on this. Previously had a biopsy in 2021 that showed stage II fibrosis and then you also had the MRI in 2022 and that was showing changes of chronic liver disease including lobar redistribution and nodular contour. At that time they saw fibrosis as well. Therefore this is not a new finding. I say we hold off on repeating the elastography for now and maybe that is something we can consider we do with the next imaging. We will review at the follow up. Be sure to continue to work on the fatty liver.  3/7/24 telemed  tb 0.4, alp 64, ast 13, alt 15 , previously 38 and 36  cbc ok platelets normal at 276  had to monitor this off the abx   1/11/24  telemed, was supposed to be in office but patient not able to come in   The 12/20/23 labs were sent to me. The bilirubin total 0.6, alkaline phosphatase 80, AST 38, ALT was 36. Previously back in October the AST was 141 and the ALT was 125 so these are much better and very happy to see this. We will review at the upcoming appointment.   11/21/23 The recent ultrasound that you did at [facility] was sent to me. The liver without any focal lesions. They noted a mildly coarsened echotexture. 16 cm in length. The gallbladder absent. Common duct 7 mm. Pancreas not well-seen due to bowel gas. Spleen appears normal and measures 8.8 x 4.7 x 8.5 cm. Right and left kidney appear normal. Overall they noted that the liver was coarsened in appearance and and suspicious for cirrhosis. If you recall previously the ultrasound was suggesting more of a fatty liver issue and then you had the labs that were up and now it is showing this picture.   so suspect the labs improving still off the bactrim  she has appt on jan 24 with neurologist for her neuropathy  weight stable. she has stopped fried ofod and limit bread and reading the labels.  weight 163   November 21 labs show WBC 4.8, hemoglobin 13.2 and that is much better than back in June when your hemoglobin was low at 7.9. Platelet count normal at 204. MCV normal at 93.3 and up from 80.7 back in June. Clearly the hemoglobin level is better. Neutrophils and lymphocytes normal. Glucose was 90 BUN is 16 creatinine 0.61 sodium 139 potassium 4.2 calcium 9.5 albumin 3.7. Your globulin is a little bit up at 3.9 this is nonspecific but could reflect some recent immunity stimulation. Bilirubin normal at 0.4 and down from 0.41 in June. Alk phos normal but slightly higher at 111. AST 76 and ALT 76 and up from 30 and 21 back in June.  I found the more recent labs (that were even higher) which you did in October 14 for comparison and on those labs your alk phos of been 198 AST had been 246 and  so clearly these labs while higher than the older listed labs are definitely lower than these current labs. You mentioned that you have been taking iron but you denied any herbals.  You did also mention that on the previous labs (that were up) you had completed a 7-day course of Bactrim which may have explain why the labs were higher and that they came down for these labs. I still would like to get you  to your June labs which were normal other than an AST of 30 and ALT of 21 again if possible. I would recommend that you repeat your labs for us again in a month to see if the liver labs have improved.   Oct 14 2023 glu 82 and ua 4.8 and bun 17 and cr 0.66 and na  137 and k 4.4 and cl 102 and ca 9.0 and alb 3.9 and tv 0.3 and alk 198 and ast 141 and alt 125 but down july 10 alk 341 and ast 246 and alt 225 and ggt 161. chol 189 and trg 115 and hdl 50 and ldl 118.  She has been eating less bread and not much fried. Lost weight 198 Jan to 138.6. ebc 5.3 and hg 12.6 and plat 194 and mcv 91 and neutrophils 2.8 and lyms 1.6.  b12 582 and folate 5.8 and vit d 26.7.  SHe is on iron but no herbals. No green tea or turmeric and no zinc or elderberry.  She says not finished 7 days bactrim for ears and the lasb were oct.  Pt since had hip surgery in 2022 and she did labs at the [facility] and [facility]. She says hips ok and now right knee and back pain. Came in in wheelchair. Has bone spur issue in ankle. 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  She says has a nerve issue and seeing neurologist and may have polyneuropathy.  June 16 .[facility] was sent in  They mentioned that the aorta and IVC were obscured by bowel gas but that the pancreas appeared normal.   The spleen was 8 cm.   The right kidney and the left kidney were both 8 cm in length.   Cholecystectomy changes were seen.   A 5 mm common bile duct was noted which is normal.   The liver span was 15 cm but was somewhat heterogeneous.  No liver masses were seen. In summary they felt that your liver was heterogeneous. While they did not say this specifically, when they say that the liver is heterogeneous, this usually means that they suspect that the liver is fatty.  As you know you had loss from about 190 about 158 pounds and we were hoping that that would help the liver to look normal at this juncture but it has not resolved yet.  Your liver biopsy that you did back in August 2021 showed mild fatty changes and stage II fibrosis.    Your August 2022 MRI showed no fat was still elevated then at 9.79 up to 11.83% with normal being 6% and they saw chronic liver disease changes including lobar redistribution and nodular contour.  June 21 _update.  They sent in the Doppler portion of your ultrasound which you did back on June 16 and it was an unremarkable exam.  They mention that you had normal waveform seen with normal flow in the aorta as well as in the inferior vena cava.  The portal vein had hepatofugal flow which is in the normal direction.  Normal flow was seen in the splenic vein.  I believe that they meant to say that she had normal flow in the hepatic vein (they called it Paddock vein on report) but I believe there is a typo there with the transcription of that. These devices will sometimes hear something and attach a different similar word to the report instead. Dr. Magana Dear Alice Grayson, June 7 and inr 1.0 and normal. Glu 78 and bun 10 and cr 0.74 and na 131 little low and prior 133. K 4.7 and cl 96. Co2 24. Ca 9.7 and alb 4.3 normal. Tb 0.7 and alk 102 and ast 30 and alt 21.  April 2022 tb 0.4 and alk 320 and 106 and alt 106.  Wbc 8.0 and hg 7.9 little low.  Hct 27.1 also low.  Platelets 387. Neutrophils 4768 and lymph 2096.  As you recall, you prior had lost from 190 to 163 to 158 pounds so that clearly helped the fatty liver. Also off the Cymbalta. Still anemic and follow up with primary re same. If start statin needs low dose and with close labs.  April 2022 local labs sent to us. Glucose was 101 is slightly up uric acid 4.9 BUN is 16 creatinine 0.71 sodium 135 potassium 4.6 chloride 101 calcium 9.3 albumin slightly low at 3.3 bilirubin 0.4 alkaline phosphatase 234.   , .  Glycerides 117 cholesterol 184.  HDL low at 36.  LDL elevated at 127.  Thyroxine T4 was 13.8 elevated.  White cell count 5.0 hemoglobin low at 9.8 hematocrit normal 35.4 MCV 87.  Platelet count 381.  Neutrophils 4.5 lymphocytes 1.8 eosinophils up at 0.1.  She had surgery July 26 2022 of the hip was.   [facility] she sees Kianna there re the anemia. Dr Miranda.   She ran out of her ursodiol and remains off it.  April 14 2022 MRI  Some motion noted during exam but limited it somewhat. Lower thorax shows heart top normal.  Bibasilar atelectasis and scarring seen.  Please share with primary provider. Liver fat noted to be elevated at 9.79 up to 11.83%. Normal is 6%   Please see chronic liver disease changes including lobar redistribution and nodular contour.  No definite liver cancer signs.  Liver vessels patent.  They do see signs of fibrosis noted. Prior cholecystectomy changes seen. Spleen normal. Some prominent portacaval lymph nodes noted measuring 1.5 x 2.7 and another measuring 0.9 x 1.7 cm and nonspecific and may be related to your liver disease. Moderate atherosclerotic disease seen but without aortic aneurysm.  Please share with primary provider.  You do have a history of hyperlipidemia and so that obviously can add to the wrist to have atherosclerosis.  I does appear that you are also on treatment for same. She was on crestor and prior held 2m ago and not on it and she may want to relook at that. Degenerative changes seen of your spine.  She remains off her crestor.  She did her cataract surgery and that went well and uses glasses to read.  Clindamycin taking for post op.  April 24 Dr Miranda and they told her to see us.  She stopped cymbalta 2/2/22  and could only be off for 3 days and then had bad panic attacks and needed to restart it and then eventually changed to paxil.   Feb 2022 u/s shows: Liver appearing normal, no liver lesion noted.  Pancreas normal, spleen normal in size.  Patent hepatic vasculature.  Common bile duct measures 5.6 mm which is within normal limits for postcholecystectomy state  Pt was dx with covid on 1/25, had symptoms the week prior. stopped doxy on 1/26, she has one more day of prednisone dose.  went to the hospital and received infusion of MAB last week.  She had symptoms around the time she did our labs. Suspect bump from this and/or DILI from doxy and cymbalta. Since she's been off of doxy, recommend she avoid this and recommend she stop cymbalta as well. will also set her up to do an u/s with doppler now assessing vessel patency.  She has not done covid 19 vaccine.  Xarelto for blood thinner 10mg for 30 days.  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 Prior had been on doxy 100mg for this for 1 year and steroids and could have added to liver issues. Both can inc the lfts and trigger issues.  She never started the dupixent for this.  She was taken off of statin rx 2 months prior to visit here in 2022.    She had labs done by pcp in oct 2021-was told lfts were abnormal   Liver biopsy from August 2021 shows mild fatty change, minimal chronic hepatitis grade 1, moderate fibrosis, stage II.  Under the description it states there is moderate degree of periportal fibrosis with few bridging portal to portal fibrosis.  Minimal degree of lobular inflammation is noted with rare spotty necrosis.  Mild microvesicular fatty change less than 20% is noted.  Clinical laboratory and serologic findings is suggested. will have this path reviewed by dr. paul   Lost 190 to 163 april in aug 2022 at 163 and now 158 today in June 2023 and watching what she eats. .

## 2025-03-14 ENCOUNTER — TELEPHONE ENCOUNTER (OUTPATIENT)
Dept: URBAN - METROPOLITAN AREA CLINIC 86 | Facility: CLINIC | Age: 70
End: 2025-03-14

## 2025-03-14 NOTE — HPI-TODAY'S VISIT:
Dear Alice Grayson,  The recent ultrasound was sent to us from [facility].  The liver with increased echogenicity.  The bile duct was normal.  The spleen about 9.3 cm and this is normal.  Everything was getting good blood flow.  Overall they saw increased echogenicity and coarsened in echotexture.  This is fairly stable.  Johanna Galdamez PA-C

## 2025-04-01 ENCOUNTER — LAB OUTSIDE AN ENCOUNTER (OUTPATIENT)
Dept: URBAN - METROPOLITAN AREA TELEHEALTH 2 | Facility: TELEHEALTH | Age: 70
End: 2025-04-01

## 2025-04-04 ENCOUNTER — LAB OUTSIDE AN ENCOUNTER (OUTPATIENT)
Dept: URBAN - METROPOLITAN AREA TELEHEALTH 2 | Facility: TELEHEALTH | Age: 70
End: 2025-04-04